# Patient Record
Sex: MALE | Race: WHITE | NOT HISPANIC OR LATINO | Employment: OTHER | URBAN - METROPOLITAN AREA
[De-identification: names, ages, dates, MRNs, and addresses within clinical notes are randomized per-mention and may not be internally consistent; named-entity substitution may affect disease eponyms.]

---

## 2017-01-04 ENCOUNTER — COMMUNICATION - HEALTHEAST (OUTPATIENT)
Dept: FAMILY MEDICINE | Facility: CLINIC | Age: 77
End: 2017-01-04

## 2017-01-04 DIAGNOSIS — K21.9 GERD (GASTROESOPHAGEAL REFLUX DISEASE): ICD-10-CM

## 2017-01-12 ENCOUNTER — COMMUNICATION - HEALTHEAST (OUTPATIENT)
Dept: FAMILY MEDICINE | Facility: CLINIC | Age: 77
End: 2017-01-12

## 2017-01-13 ENCOUNTER — RECORDS - HEALTHEAST (OUTPATIENT)
Dept: GENERAL RADIOLOGY | Facility: CLINIC | Age: 77
End: 2017-01-13

## 2017-01-13 ENCOUNTER — OFFICE VISIT - HEALTHEAST (OUTPATIENT)
Dept: FAMILY MEDICINE | Facility: CLINIC | Age: 77
End: 2017-01-13

## 2017-01-13 DIAGNOSIS — Z00.00 PREVENTATIVE HEALTH CARE: ICD-10-CM

## 2017-01-13 DIAGNOSIS — L30.9 DERMATITIS: ICD-10-CM

## 2017-01-13 DIAGNOSIS — E78.00 HYPERCHOLESTEREMIA: ICD-10-CM

## 2017-01-13 DIAGNOSIS — K21.9 GASTROESOPHAGEAL REFLUX DISEASE WITHOUT ESOPHAGITIS: ICD-10-CM

## 2017-01-13 DIAGNOSIS — K62.89 PROCTITIS: ICD-10-CM

## 2017-01-13 DIAGNOSIS — K21.9 GERD (GASTROESOPHAGEAL REFLUX DISEASE): ICD-10-CM

## 2017-01-13 DIAGNOSIS — R05.9 COUGH: ICD-10-CM

## 2017-01-13 LAB
ATRIAL RATE - MUSE: 53 BPM
DIASTOLIC BLOOD PRESSURE - MUSE: NORMAL MMHG
INTERPRETATION ECG - MUSE: NORMAL
P AXIS - MUSE: 63 DEGREES
PR INTERVAL - MUSE: 158 MS
QRS DURATION - MUSE: 78 MS
QT - MUSE: 434 MS
QTC - MUSE: 407 MS
R AXIS - MUSE: 22 DEGREES
SYSTOLIC BLOOD PRESSURE - MUSE: NORMAL MMHG
T AXIS - MUSE: 45 DEGREES
VENTRICULAR RATE- MUSE: 53 BPM

## 2017-01-13 ASSESSMENT — MIFFLIN-ST. JEOR: SCORE: 1517.62

## 2017-01-17 ENCOUNTER — COMMUNICATION - HEALTHEAST (OUTPATIENT)
Dept: FAMILY MEDICINE | Facility: CLINIC | Age: 77
End: 2017-01-17

## 2017-01-31 ENCOUNTER — COMMUNICATION - HEALTHEAST (OUTPATIENT)
Dept: FAMILY MEDICINE | Facility: CLINIC | Age: 77
End: 2017-01-31

## 2017-03-15 ENCOUNTER — COMMUNICATION - HEALTHEAST (OUTPATIENT)
Dept: FAMILY MEDICINE | Facility: CLINIC | Age: 77
End: 2017-03-15

## 2017-04-01 ENCOUNTER — COMMUNICATION - HEALTHEAST (OUTPATIENT)
Dept: FAMILY MEDICINE | Facility: CLINIC | Age: 77
End: 2017-04-01

## 2017-04-01 DIAGNOSIS — K21.9 GERD (GASTROESOPHAGEAL REFLUX DISEASE): ICD-10-CM

## 2017-04-04 ENCOUNTER — OFFICE VISIT - HEALTHEAST (OUTPATIENT)
Dept: FAMILY MEDICINE | Facility: CLINIC | Age: 77
End: 2017-04-04

## 2017-04-04 DIAGNOSIS — J01.90 ACUTE SINUSITIS: ICD-10-CM

## 2017-04-04 DIAGNOSIS — H69.91 EUSTACHIAN TUBE DYSFUNCTION, RIGHT: ICD-10-CM

## 2017-04-04 DIAGNOSIS — R05.9 COUGH: ICD-10-CM

## 2017-04-05 ENCOUNTER — AMBULATORY - HEALTHEAST (OUTPATIENT)
Dept: FAMILY MEDICINE | Facility: CLINIC | Age: 77
End: 2017-04-05

## 2017-04-05 ENCOUNTER — RECORDS - HEALTHEAST (OUTPATIENT)
Dept: ADMINISTRATIVE | Facility: OTHER | Age: 77
End: 2017-04-05

## 2017-04-05 ENCOUNTER — COMMUNICATION - HEALTHEAST (OUTPATIENT)
Dept: SCHEDULING | Facility: CLINIC | Age: 77
End: 2017-04-05

## 2017-04-05 DIAGNOSIS — R11.0 NAUSEA: ICD-10-CM

## 2017-04-06 ENCOUNTER — COMMUNICATION - HEALTHEAST (OUTPATIENT)
Dept: FAMILY MEDICINE | Facility: CLINIC | Age: 77
End: 2017-04-06

## 2017-09-05 ENCOUNTER — RECORDS - HEALTHEAST (OUTPATIENT)
Dept: ADMINISTRATIVE | Facility: OTHER | Age: 77
End: 2017-09-05

## 2017-10-12 ENCOUNTER — COMMUNICATION - HEALTHEAST (OUTPATIENT)
Dept: FAMILY MEDICINE | Facility: CLINIC | Age: 77
End: 2017-10-12

## 2017-10-12 DIAGNOSIS — K62.89 PROCTITIS: ICD-10-CM

## 2017-10-12 DIAGNOSIS — K21.9 GASTROESOPHAGEAL REFLUX DISEASE WITHOUT ESOPHAGITIS: ICD-10-CM

## 2017-10-13 ENCOUNTER — OFFICE VISIT - HEALTHEAST (OUTPATIENT)
Dept: FAMILY MEDICINE | Facility: CLINIC | Age: 77
End: 2017-10-13

## 2017-10-13 DIAGNOSIS — Z23 NEED FOR INFLUENZA VACCINATION: ICD-10-CM

## 2017-10-13 DIAGNOSIS — H93.8X1 IRRITATION OF EAR, RIGHT: ICD-10-CM

## 2017-10-13 ASSESSMENT — MIFFLIN-ST. JEOR: SCORE: 1588.95

## 2018-04-26 ENCOUNTER — AMBULATORY - HEALTHEAST (OUTPATIENT)
Dept: FAMILY MEDICINE | Facility: CLINIC | Age: 78
End: 2018-04-26

## 2018-04-26 RX ORDER — DOXYCYCLINE 100 MG/1
CAPSULE ORAL
Status: SHIPPED | COMMUNITY
Start: 2017-04-04 | End: 2023-12-05

## 2018-04-26 RX ORDER — ONDANSETRON 4 MG/1
4 TABLET, ORALLY DISINTEGRATING ORAL
Status: SHIPPED | COMMUNITY
Start: 2017-04-05 | End: 2023-07-18

## 2021-05-30 ENCOUNTER — RECORDS - HEALTHEAST (OUTPATIENT)
Dept: ADMINISTRATIVE | Facility: CLINIC | Age: 81
End: 2021-05-30

## 2021-05-30 VITALS — WEIGHT: 182 LBS | BODY MASS INDEX: 25.93 KG/M2

## 2021-05-30 VITALS — HEIGHT: 70 IN | WEIGHT: 178 LBS | BODY MASS INDEX: 25.48 KG/M2

## 2021-05-31 VITALS — WEIGHT: 187.6 LBS | BODY MASS INDEX: 25.41 KG/M2 | HEIGHT: 72 IN

## 2021-06-01 ENCOUNTER — RECORDS - HEALTHEAST (OUTPATIENT)
Dept: ADMINISTRATIVE | Facility: CLINIC | Age: 81
End: 2021-06-01

## 2021-06-08 NOTE — PROGRESS NOTES
"Subjective:    Roscoe Merida is seen today for multiple concerns.  Tickle in throat.  Causes cough occasionally.  Does have history reflux and dyspepsia.  Uses omeprazole.  When he stops this is symptoms worsen.  Did quit smoking 40 years ago after 25 pack year history without chronic concerns for breathing issues etc.  No chest pain.  Perhaps occasional palpitation noted.  Has had mild cholesterol elevation however ratio less than 4.  Past medical social and family history reviewed and updated as noted below.  Skin irritation behind right ear.  He is gassy with flatus described.  No bright red blood per rectum currently.  No diarrhea or constipation concerns.    Remarried - Betsey x 2016   x  (prior  \"Connie\" x 73 - benign brain tumor, then PE noted following surgery, then seizure occurred...)  2 sons - Neftali and Justin   1 step-son - David, 1 step-daughter - Gladys   No smoke - quit ~ 39 y.o. after ~ 25 pack-year   EtOH occ beer   Surgeries: T & A as child   Hospitalizations: none   Mom -  (phlebitis)   Dad -  (minor heart attacks)   2 sis - arthritis   1 bro - Parkinson's   Work: unemployeed (can't find a job due to age) - prior \"runner\" with [employer] [id number]   Genaro Benavides (takes part in [employer])   Colonoscopy 11 with \"advance adenoma\" (tubulovillous) with recommendation for repeat in 6 months however patient cannot afford (insurance will only cover q 2 years) - subsequent colonoscopy with tubular adenoma 2014 and told to repeat 5 years.    Past Surgical History   Procedure Laterality Date     Tonsillectomy and adenoidectomy          Family History   Problem Relation Age of Onset     Heart disease Father      Parkinsonism Brother         Past Medical History   Diagnosis Date     BPH (benign prostatic hyperplasia)         Social History   Substance Use Topics     Smoking status: Former Smoker     Smokeless tobacco: None     " "Alcohol use No        Current Outpatient Prescriptions   Medication Sig Dispense Refill     ascorbic Acid (VITAMIN C) 500 mg CpER Take 1,000 mg by mouth daily.       aspirin 81 MG EC tablet Take 81 mg by mouth daily.       diphenhydrAMINE (BENADRYL) 25 mg tablet Take 25 mg by mouth.       hydrocortisone (PROCTOSOL HC) 2.5 % rectal cream APPLY TO ANAL AREA TWO TIMES A DAY AS NEEDED FOR IRRITATION 85.05 g 3     MULTIVITAMIN ORAL Take 1 tablet by mouth daily.       omeprazole (PRILOSEC) 20 MG capsule TAKE ONE CAPSULE BY MOUTH EVERY DAY 90 capsule 3     sildenafil (VIAGRA) 100 MG tablet Take 1 tablet (100 mg total) by mouth as needed for erectile dysfunction. 10 tablet 1     triamcinolone (KENALOG) 0.1 % ointment Apply topically 2 (two) times a day. 30 g 0     No current facility-administered medications for this visit.           Objective:    Vitals:    01/13/17 0758   BP: 120/60   Pulse: 60   Weight: 178 lb (80.7 kg)   Height: 5' 10.25\" (1.784 m)      Body mass index is 25.36 kg/(m^2).    Alert.  No apparent distress.  Chest clear.  Cardiac exam regular.  HEENT exam with some dermatitis postauricular location on right side.  TMs otherwise normal with nasopharyngeal oropharynx unremarkable.  Status post tonsillectomy noted.  Abdomen benign without mass.  Extremities warm and dry.     EKG:  EKG today with PAC with ventricular rate 53 otherwise normal.      XR CHEST PA AND LATERAL1/13/2017 8:38 AMINDICATION: CoughCOMPARISON: None.FINDINGS: Negative chest.This report was electronically interpreted by: Dr. Jay Schofield MD ON 01/13/2017 at 09:00    Assessment:    1. Hypercholesteremia  Electrocardiogram Perform and Read   2. GERD (gastroesophageal reflux disease)     3. Cough  XR Chest PA and Lateral   4. Dermatitis     5. Preventative health care  Tdap vaccine greater than or equal to 6yo IM   6. Gastroesophageal reflux disease without esophagitis  omeprazole (PRILOSEC) 20 MG capsule   7. Proctitis  hydrocortisone " (PROCTOSOL HC) 2.5 % rectal cream         Plan:    40 minutes total time with patient, > 50% with counseling and coordination of cares.  EKG today with PAC with ventricular rate 53 otherwise normal.  Chest x-ray was obtained due to intermittent cough likely reflux etiology versus mild recent URI.  Will notify with results of chest x-ray for abnormal.  Hydrocortisone cream 1% twice daily as needed to areas of dermatitis right post auricular location.  Adacel booster given.  Did instruct patient use omeprazole and consistent basis for reflux management as well as likely improvement in intermittent cough.  Refill provided on hydrocortisone rectal cream for proctitis management.  Anticipate physical exam August 2017.

## 2021-06-09 NOTE — PROGRESS NOTES
"Subjective:    Roscoe Merida is seen today for ongoing illness.  Described concerns with postnasal drainage.  Worsening now over past week.  Initially thought perhaps allergies.  Headache more frontal headache.  Cough with tickle.  Postnasal drainage.  Right ear feels plugged.  Cough that is quite disruptive.  Laryngitis in the last day.  Denies shortness of breath.  No chest pain.  Immunizations reviewed and up-to-date including Pneumovax and Prevnar.  No nausea vomiting.  No diarrhea.  Wife currently well.  Flu shot received 2016.  Denies recent travel.    Remarried - Betsey x 2016   x  (prior  \"Connie\" x 73 - benign brain tumor, then PE noted following surgery, then seizure occurred...)  2 sons - Neftali and Justin   1 step-son - David, 1 step-daughter - Gladys   No smoke - quit ~ 39 y.o. after ~ 25 pack-year   EtOH occ beer   Surgeries: T & A as child   Hospitalizations: none   Mom -  (phlebitis)   Dad -  (minor heart attacks)   2 sis - arthritis   1 bro - Parkinson's   Work: unemployeed (can't find a job due to age) - prior \"runner\" with [employer]   [employer] [id number]   Genaro Benavides (takes part in [employer])   Colonoscopy 11 with \"advance adenoma\" (tubulovillous) with recommendation for repeat in 6 months however patient cannot afford (insurance will only cover q 2 years) - subsequent colonoscopy with tubular adenoma 2014 and told to repeat 5 years.    Past Surgical History:   Procedure Laterality Date     TONSILLECTOMY AND ADENOIDECTOMY          Family History   Problem Relation Age of Onset     Heart disease Father      Parkinsonism Brother         Past Medical History:   Diagnosis Date     BPH (benign prostatic hyperplasia)         Social History   Substance Use Topics     Smoking status: Former Smoker     Smokeless tobacco: None     Alcohol use No        Current Outpatient Prescriptions   Medication Sig Dispense Refill     " ascorbic Acid (VITAMIN C) 500 mg CpER Take 1,000 mg by mouth daily.       aspirin 81 MG EC tablet Take 81 mg by mouth daily.       diphenhydrAMINE (BENADRYL) 25 mg tablet Take 25 mg by mouth.       hydrocortisone (PROCTOSOL HC) 2.5 % rectal cream APPLY TO ANAL AREA TWO TIMES A DAY AS NEEDED FOR IRRITATION 85.05 g 3     MULTIVITAMIN ORAL Take 1 tablet by mouth daily.       omeprazole (PRILOSEC) 20 MG capsule TAKE ONE CAPSULE BY MOUTH EVERY DAY 90 capsule 3     triamcinolone (KENALOG) 0.1 % ointment Apply topically 2 (two) times a day. 30 g 0     codeine-guaiFENesin (GUAIFENESIN AC)  mg/5 mL liquid Take 5-10 mL by mouth 3 (three) times a day as needed for cough. 240 mL 0     doxycycline (VIBRAMYCIN) 100 MG capsule Take 1 capsule (100 mg total) by mouth 2 (two) times a day for 10 days. 20 capsule 0     sildenafil (VIAGRA) 100 MG tablet Take 1 tablet (100 mg total) by mouth as needed for erectile dysfunction. 10 tablet 1     No current facility-administered medications for this visit.           Objective:    Vitals:    04/04/17 1514   BP: 130/60   Pulse: 72   Temp: 97.9  F (36.6  C)   Weight: 182 lb (82.6 kg)      Body mass index is 25.93 kg/(m^2).    Alert.  Laryngitis noted.  Quiet voice.  Frequent cough noted.  HEENT exam with conjunctiva clear.  Right TM with chronic tympanosclerosis and some white retracted appearance otherwise external auditory canals normal and the left TM normal.  Nasopharynx with increased congestion and scant postnasal drainage and oral pharyngeal exam.  Moist mucous membranes.  Neck supple.  Chest relatively clear without inspiratory crackles or expiratory wheeze.  Cardiac exam appears regular.  Extremities warm and dry.      Assessment:    1. Acute sinusitis  doxycycline (VIBRAMYCIN) 100 MG capsule   2. Eustachian tube dysfunction, right     3. Cough  codeine-guaiFENesin (GUAIFENESIN AC)  mg/5 mL liquid         Plan:    Discussed sinusitis management.  Elects doxycycline 100 mg  twice daily ×10 days.  Guaifenesin with codeine for cough suppression as directed.  Mucinex OTC may be utilized for eustachian tube dysfunction notify with worsening or nonimprovement.

## 2021-06-13 NOTE — PROGRESS NOTES
Assessment/Plan:     1. Irritation of ear, right  Area of dried cerumen was flushed with lavage and finished with curette.  Did report resolution of symptoms.  We will plan to continue to monitor.  Call or return to care if symptoms worsen or do not improve.  No signs of infection.    2. Need for influenza vaccination  Flu vaccine given.        Subjective:      Roscoe Merida is a 77 y.o. male comes in today for follow-up on irritation on the right ear.  He states that this has been going on intermittently he noticed it first about a month ago.  He was seen at the emergency room I was able to review the visit notes via care everywhere.  At that time they told him that some hairs were irritating his eardrum.  States that those were flushed out and he was given a prescription of ofloxacin drops.  Interestingly enough they actually gave him ophthalmic drops but told him it was okay to use in the ER.  He states that he felt better after he was seen there.  Was told to only use the drops if he needed it.  He states that he has had an irritation a total of 3 times.  He states that he put a few of the drops in and it seems to go away.  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  He states he feels well overall he does not have any sinus pain or pressure runny nose or symptoms of illnesses.  He states it is more of an irritation rather than a pain he has had no change in his hearing.  He does not have any concerns for his left ear.  He would like to get the flu shot today.    Current Outpatient Prescriptions   Medication Sig Dispense Refill     ascorbic Acid (VITAMIN C) 500 mg CpER Take 1,000 mg by mouth daily.       aspirin 81 MG EC tablet Take 81 mg by mouth daily.       hydrocortisone (PROCTOSOL HC) 2.5 % rectal cream APPLY TO ANAL AREA TWO TIMES A DAY AS NEEDED FOR IRRITATION 85.05 g 3     MULTIVITAMIN ORAL Take 1 tablet by mouth daily.       omeprazole (PRILOSEC) 20  MG capsule TAKE ONE CAPSULE BY MOUTH EVERY DAY 90 capsule 3     ondansetron (ZOFRAN, AS HYDROCHLORIDE,) 4 MG tablet Take 1 tablet (4 mg total) by mouth every 8 (eight) hours as needed for nausea. 20 tablet 1     triamcinolone (KENALOG) 0.1 % ointment Apply topically 2 (two) times a day. 30 g 0     diphenhydrAMINE (BENADRYL) 25 mg tablet Take 25 mg by mouth.       HYDROcodone-acetaminophen 5-325 mg per tablet 1-2 tabs po q4-6 hours as needed for pain 15 tablet 0     No current facility-administered medications for this visit.      Allergies   Allergen Reactions     Penicillins      Past Medical History, Family History, and Social History reviewed.  Past Medical History:   Diagnosis Date     BPH (benign prostatic hyperplasia)      Hyperlipidemia      Migraine      Past Surgical History:   Procedure Laterality Date     TONSILLECTOMY AND ADENOIDECTOMY       Penicillins  Family History   Problem Relation Age of Onset     Heart disease Father      Parkinsonism Brother      Social History     Social History     Marital status:      Spouse name: N/A     Number of children: N/A     Years of education: N/A     Occupational History     Not on file.     Social History Main Topics     Smoking status: Former Smoker     Smokeless tobacco: Not on file     Alcohol use No     Drug use: No     Sexual activity: Not on file     Other Topics Concern     Not on file     Social History Narrative         Review of systems is as stated in HPI, and the remainder of the 10 system review is otherwise negative.    Objective:     Vitals:    10/13/17 0719   BP: 122/60   Patient Site: Left Arm   Patient Position: Sitting   Cuff Size: Adult Regular   Pulse: 64   Weight: 187 lb 9.6 oz (85.1 kg)   Height: 6' (1.829 m)    Body mass index is 25.44 kg/(m^2).  Wt Readings from Last 3 Encounters:   10/13/17 187 lb 9.6 oz (85.1 kg)   04/06/17 174 lb (78.9 kg)   04/04/17 182 lb (82.6 kg)       General Appearance:    Alert, cooperative, no distress,  appears stated age    Head:    Normocephalic, without obvious abnormality, atraumatic   Eyes:    PERRL, EOM's intact, no conjunctivitis    Ears:   Bilateral tympanic membranes are normal.  There is initially a area of dried cerumen overlying part of the tympanic membrane in the right this is removed revealing normal tympanic membranes otherwise normal external ear canals   Nose:   Mucosa normal, no drainage     or sinus tenderness   Throat:   Oropharynx is clear   Neck:   Supple, symmetrical, no adenopathy, no thyromegally       Lungs:     Clear to auscultation bilaterally, respirations unlabored   Chest Wall:    No tenderness or deformity    Heart:    Regular rate and rhythm, S1 and S2 normal, no murmur, rub    or gallop                           Neuro:   cranial nerves grossly intact   Psych:   grossly normal mood and affect without acute anxiety or psychosis    Skin:   No rashes or lesions   :          This note has been dictated using voice recognition software. Any grammatical or context distortions are unintentional and inherent to the software.

## 2021-06-26 ENCOUNTER — HEALTH MAINTENANCE LETTER (OUTPATIENT)
Age: 81
End: 2021-06-26

## 2021-10-16 ENCOUNTER — HEALTH MAINTENANCE LETTER (OUTPATIENT)
Age: 81
End: 2021-10-16

## 2022-07-23 ENCOUNTER — HEALTH MAINTENANCE LETTER (OUTPATIENT)
Age: 82
End: 2022-07-23

## 2022-10-01 ENCOUNTER — HEALTH MAINTENANCE LETTER (OUTPATIENT)
Age: 82
End: 2022-10-01

## 2023-07-18 ENCOUNTER — HOSPITAL ENCOUNTER (EMERGENCY)
Facility: HOSPITAL | Age: 83
Discharge: HOME | End: 2023-07-18
Attending: EMERGENCY MEDICINE | Admitting: EMERGENCY MEDICINE
Payer: COMMERCIAL

## 2023-07-18 ENCOUNTER — APPOINTMENT (OUTPATIENT)
Dept: CT IMAGING | Facility: HOSPITAL | Age: 83
End: 2023-07-18
Attending: EMERGENCY MEDICINE
Payer: COMMERCIAL

## 2023-07-18 VITALS
TEMPERATURE: 97.5 F | HEART RATE: 63 BPM | DIASTOLIC BLOOD PRESSURE: 78 MMHG | BODY MASS INDEX: 23.91 KG/M2 | RESPIRATION RATE: 16 BRPM | SYSTOLIC BLOOD PRESSURE: 167 MMHG | HEIGHT: 70 IN | OXYGEN SATURATION: 98 % | WEIGHT: 167 LBS

## 2023-07-18 DIAGNOSIS — J32.9 SINUSITIS, UNSPECIFIED CHRONICITY, UNSPECIFIED LOCATION: ICD-10-CM

## 2023-07-18 DIAGNOSIS — R51.9 HEADACHE, UNSPECIFIED HEADACHE TYPE: ICD-10-CM

## 2023-07-18 DIAGNOSIS — R11.2 NAUSEA AND VOMITING, UNSPECIFIED VOMITING TYPE: ICD-10-CM

## 2023-07-18 DIAGNOSIS — E87.5 HYPERKALEMIA: ICD-10-CM

## 2023-07-18 PROBLEM — R97.20 ELEVATED PSA: Status: ACTIVE | Noted: 2023-07-18

## 2023-07-18 PROBLEM — G31.84 MINIMAL COGNITIVE IMPAIRMENT: Status: ACTIVE | Noted: 2023-07-18

## 2023-07-18 PROBLEM — N40.1 BENIGN PROSTATIC HYPERPLASIA WITH URINARY OBSTRUCTION: Status: ACTIVE | Noted: 2018-02-08

## 2023-07-18 PROBLEM — N13.8 BENIGN PROSTATIC HYPERPLASIA WITH URINARY OBSTRUCTION: Status: ACTIVE | Noted: 2018-02-08

## 2023-07-18 PROBLEM — C44.90 MALIGNANT NEOPLASM OF SKIN: Status: ACTIVE | Noted: 2019-06-20

## 2023-07-18 LAB
ALBUMIN SERPL BCG-MCNC: 4.6 G/DL (ref 3.5–5.2)
ALP SERPL-CCNC: 88 U/L (ref 40–129)
ALT SERPL W P-5'-P-CCNC: ABNORMAL U/L
ANION GAP SERPL CALCULATED.3IONS-SCNC: 11 MMOL/L (ref 7–15)
ANION GAP SERPL CALCULATED.3IONS-SCNC: 12 MMOL/L (ref 7–15)
AST SERPL W P-5'-P-CCNC: ABNORMAL U/L
BASOPHILS # BLD AUTO: 0 10E3/UL (ref 0–0.2)
BASOPHILS NFR BLD AUTO: 0 %
BILIRUB SERPL-MCNC: 1 MG/DL
BUN SERPL-MCNC: 15.3 MG/DL (ref 8–23)
BUN SERPL-MCNC: 15.7 MG/DL (ref 8–23)
CALCIUM SERPL-MCNC: 9 MG/DL (ref 8.8–10.2)
CALCIUM SERPL-MCNC: 9.4 MG/DL (ref 8.8–10.2)
CHLORIDE SERPL-SCNC: 98 MMOL/L (ref 98–107)
CHLORIDE SERPL-SCNC: 99 MMOL/L (ref 98–107)
CREAT SERPL-MCNC: 0.73 MG/DL (ref 0.67–1.17)
CREAT SERPL-MCNC: 0.77 MG/DL (ref 0.67–1.17)
CRP SERPL-MCNC: <3 MG/L
DEPRECATED HCO3 PLAS-SCNC: 23 MMOL/L (ref 22–29)
DEPRECATED HCO3 PLAS-SCNC: 24 MMOL/L (ref 22–29)
EOSINOPHIL # BLD AUTO: 0 10E3/UL (ref 0–0.7)
EOSINOPHIL NFR BLD AUTO: 0 %
ERYTHROCYTE [DISTWIDTH] IN BLOOD BY AUTOMATED COUNT: 12.3 % (ref 10–15)
GFR SERPL CREATININE-BSD FRML MDRD: 88 ML/MIN/1.73M2
GFR SERPL CREATININE-BSD FRML MDRD: 90 ML/MIN/1.73M2
GLUCOSE SERPL-MCNC: 112 MG/DL (ref 70–99)
GLUCOSE SERPL-MCNC: 112 MG/DL (ref 70–99)
HCT VFR BLD AUTO: 44.1 % (ref 40–53)
HGB BLD-MCNC: 14.7 G/DL (ref 13.3–17.7)
IMM GRANULOCYTES # BLD: 0 10E3/UL
IMM GRANULOCYTES NFR BLD: 0 %
LIPASE SERPL-CCNC: 22 U/L (ref 13–60)
LYMPHOCYTES # BLD AUTO: 0.6 10E3/UL (ref 0.8–5.3)
LYMPHOCYTES NFR BLD AUTO: 10 %
MCH RBC QN AUTO: 31.3 PG (ref 26.5–33)
MCHC RBC AUTO-ENTMCNC: 33.3 G/DL (ref 31.5–36.5)
MCV RBC AUTO: 94 FL (ref 78–100)
MONOCYTES # BLD AUTO: 0.2 10E3/UL (ref 0–1.3)
MONOCYTES NFR BLD AUTO: 4 %
NEUTROPHILS # BLD AUTO: 5.6 10E3/UL (ref 1.6–8.3)
NEUTROPHILS NFR BLD AUTO: 86 %
NRBC # BLD AUTO: 0 10E3/UL
NRBC BLD AUTO-RTO: 0 /100
PLATELET # BLD AUTO: 261 10E3/UL (ref 150–450)
POTASSIUM SERPL-SCNC: 5.6 MMOL/L (ref 3.4–5.3)
POTASSIUM SERPL-SCNC: 5.6 MMOL/L (ref 3.4–5.3)
PROT SERPL-MCNC: 7.4 G/DL (ref 6.4–8.3)
RBC # BLD AUTO: 4.69 10E6/UL (ref 4.4–5.9)
SODIUM SERPL-SCNC: 133 MMOL/L (ref 136–145)
SODIUM SERPL-SCNC: 134 MMOL/L (ref 136–145)
WBC # BLD AUTO: 6.5 10E3/UL (ref 4–11)

## 2023-07-18 PROCEDURE — 250N000011 HC RX IP 250 OP 636: Performed by: EMERGENCY MEDICINE

## 2023-07-18 PROCEDURE — 86140 C-REACTIVE PROTEIN: CPT | Performed by: EMERGENCY MEDICINE

## 2023-07-18 PROCEDURE — 99285 EMERGENCY DEPT VISIT HI MDM: CPT | Mod: 25

## 2023-07-18 PROCEDURE — 70450 CT HEAD/BRAIN W/O DYE: CPT

## 2023-07-18 PROCEDURE — 250N000013 HC RX MED GY IP 250 OP 250 PS 637: Performed by: EMERGENCY MEDICINE

## 2023-07-18 PROCEDURE — 84155 ASSAY OF PROTEIN SERUM: CPT | Performed by: EMERGENCY MEDICINE

## 2023-07-18 PROCEDURE — 85004 AUTOMATED DIFF WBC COUNT: CPT | Performed by: EMERGENCY MEDICINE

## 2023-07-18 PROCEDURE — 96374 THER/PROPH/DIAG INJ IV PUSH: CPT

## 2023-07-18 PROCEDURE — 83690 ASSAY OF LIPASE: CPT | Performed by: EMERGENCY MEDICINE

## 2023-07-18 PROCEDURE — 96361 HYDRATE IV INFUSION ADD-ON: CPT

## 2023-07-18 PROCEDURE — 96375 TX/PRO/DX INJ NEW DRUG ADDON: CPT

## 2023-07-18 PROCEDURE — 80048 BASIC METABOLIC PNL TOTAL CA: CPT | Performed by: EMERGENCY MEDICINE

## 2023-07-18 PROCEDURE — 258N000003 HC RX IP 258 OP 636: Performed by: EMERGENCY MEDICINE

## 2023-07-18 PROCEDURE — 36415 COLL VENOUS BLD VENIPUNCTURE: CPT | Performed by: EMERGENCY MEDICINE

## 2023-07-18 RX ORDER — ACETAMINOPHEN 325 MG/1
975 TABLET ORAL ONCE
Status: COMPLETED | OUTPATIENT
Start: 2023-07-18 | End: 2023-07-18

## 2023-07-18 RX ORDER — AZITHROMYCIN 250 MG/1
TABLET, FILM COATED ORAL
Qty: 6 TABLET | Refills: 0 | Status: SHIPPED | OUTPATIENT
Start: 2023-07-18 | End: 2023-07-23

## 2023-07-18 RX ORDER — ONDANSETRON 2 MG/ML
4 INJECTION INTRAMUSCULAR; INTRAVENOUS ONCE
Status: COMPLETED | OUTPATIENT
Start: 2023-07-18 | End: 2023-07-18

## 2023-07-18 RX ORDER — KETOROLAC TROMETHAMINE 15 MG/ML
15 INJECTION, SOLUTION INTRAMUSCULAR; INTRAVENOUS ONCE
Status: COMPLETED | OUTPATIENT
Start: 2023-07-18 | End: 2023-07-18

## 2023-07-18 RX ORDER — ONDANSETRON 4 MG/1
4 TABLET, ORALLY DISINTEGRATING ORAL ONCE
Status: COMPLETED | OUTPATIENT
Start: 2023-07-18 | End: 2023-07-18

## 2023-07-18 RX ORDER — ONDANSETRON 4 MG/1
4 TABLET, ORALLY DISINTEGRATING ORAL EVERY 6 HOURS PRN
Qty: 10 TABLET | Refills: 0 | Status: SHIPPED | OUTPATIENT
Start: 2023-07-18 | End: 2023-07-21

## 2023-07-18 RX ADMIN — ONDANSETRON 4 MG: 4 TABLET, ORALLY DISINTEGRATING ORAL at 11:21

## 2023-07-18 RX ADMIN — ACETAMINOPHEN 975 MG: 325 TABLET ORAL at 13:15

## 2023-07-18 RX ADMIN — KETOROLAC TROMETHAMINE 15 MG: 15 INJECTION INTRAMUSCULAR; INTRAVENOUS at 12:55

## 2023-07-18 RX ADMIN — ONDANSETRON 4 MG: 2 INJECTION INTRAMUSCULAR; INTRAVENOUS at 12:54

## 2023-07-18 RX ADMIN — SODIUM CHLORIDE 500 ML: 9 INJECTION, SOLUTION INTRAVENOUS at 11:43

## 2023-07-18 ASSESSMENT — ENCOUNTER SYMPTOMS
NUMBNESS: 0
WEAKNESS: 0
VOMITING: 1
NAUSEA: 1
FEVER: 0
HEADACHES: 1
ABDOMINAL PAIN: 0
CHILLS: 0

## 2023-07-18 NOTE — ED TRIAGE NOTES
"Patient states he ate at [employer] yesterday and the sauce they use for shrimp tasted \"off\" awoke at 0530 with nausea and vomiting. Also complaining of headache. Did have a normal stool this am before vomiting started      "

## 2023-07-18 NOTE — DISCHARGE INSTRUCTIONS
Your potassium level today was slightly elevated but both lab specimens were hemolyzed which artificially increase the potassium level.  Make sure you stay well-hydrated.  Have your doctor recheck the potassium level in the next few days.

## 2023-07-18 NOTE — ED PROVIDER NOTES
Emergency Department Encounter      NAME: Roscoe Merida  AGE: 83 year old male  YOB: 1940  MRN: [id number]  EVALUATION DATE & TIME: No admission date for patient encounter.    PCP: [facility]    ED PROVIDER: Misbah Millan M.D.      Chief Complaint   Patient presents with     Vomiting         FINAL IMPRESSION:  1. Sinusitis, unspecified chronicity, unspecified location    2. Headache, unspecified headache type    3. Nausea and vomiting, unspecified vomiting type    4. Hyperkalemia        MEDICAL DECISION MAKIN:12 PM I met with the patient, obtained history, performed an initial exam, and discussed options and plan for diagnostics and treatment here in the ED.     This patient is an 83-year-old male who presents with vomiting.  He said that he ate at [email or web address] yesterday and then early this morning around 4 AM he began to vomit.  He says that he has had numerous vomiting episodes.  He complains of new headache in his forehead.  His neurologic exam is unremarkable.  He had not had any fevers or chills.  No abdominal pain.  No numbness weakness or incoordination of the arms or legs.  He had a benign abdominal exam.  I was concerned about the possibility of a intracranial process causing the headache and the vomiting.  The differential of this would include subarachnoid hemorrhage, intracranial neoplasm, sinusitis, subdural hematoma or epidural hematoma.  Head CT was done which did not show any acute intracranial findings but did show signs of sinusitis.  I independently reviewed and interpreted the head CT.  The patient's nausea is better with Zofran and he was given a bolus of IV normal saline while in the ER.  I discussed with him the results of the CT scan and include a prescription for azithromycin and Zofran in his discharge paperwork.  His initial lab work showed a slightly elevated potassium but the blood was hemolyzed so another specimen was sent.  Unfortunately this blood  was also hemolyzed and gave almost the identical result.  His renal function was normal though.  I recommended that the patient get a outpatient potassium level rechecked in the next couple days to ensure that it continues to be normal.    Pertinent Labs & Imaging studies reviewed. (See chart for details)    The importance of close follow up was discussed. We reviewed warning signs and symptoms, and I instructed Mr. Merida to return to the emergency department immediately if he develops any new or worsening symptoms. I provided additional verbal discharge instructions. Mr. Merida expressed understanding and agreement with this plan of care, his questions were answered, and he was discharged in stable condition.     Medical Decision Making     History:    Supplemental history from: Documented in chart, if applicable    External Record(s) reviewed: Documented in chart, if applicable.     Work Up:    Chart documentation includes differential considered and any EKGs or imaging independently interpreted by provider, where specified.    In additional to work up documented, I considered the following work up: Documented in chart, if applicable.     External consultation:    Discussion of management with another provider: Documented in chart, if applicable     Complicating factors:    Care impacted by chronic illness: migraines, benign tubular adenoma of large intestine, hypercholesterolemia, skin cancer     Care affected by social determinants of health: Access to Medical Care     Disposition considerations:  Discharge patient with prescription for azithromycin and Zofran      MEDICATIONS GIVEN IN THE EMERGENCY:  Medications   ondansetron (ZOFRAN ODT) ODT tab 4 mg (4 mg Oral $Given 7/18/23 1121)   0.9% sodium chloride BOLUS (0 mLs Intravenous Stopped 7/18/23 1254)   ketorolac (TORADOL) injection 15 mg (15 mg Intravenous $Given 7/18/23 1255)   acetaminophen (TYLENOL) tablet 975 mg (975 mg Oral $Given 7/18/23 1315)    ondansetron (ZOFRAN) injection 4 mg (4 mg Intravenous $Given 7/18/23 2324)       NEW PRESCRIPTIONS STARTED AT TODAY'S ER VISIT:  New Prescriptions    AZITHROMYCIN (ZITHROMAX Z-FANNY) 250 MG TABLET    Two tablets on the first day, then one tablet daily for the next 4 days    ONDANSETRON (ZOFRAN ODT) 4 MG ODT TAB    Take 1 tablet (4 mg) by mouth every 6 hours as needed for nausea          =================================================================    HPI    Patient information was obtained from: Patient    Use of : N/A        Roscoe Merida is a 83 year old male with a past medical history of migraines, benign tubular adenoma of large intestine, hypercholesterolemia, skin cancer , who presents with vomiting.    Patient states he ate at [employer] yesterday, came home and went to bed feeling fine. Around 4:00 AM (~8 hours ago) the patient woke up with nausea and has been having repeated emesis since. He additionally endorses a 5/10 in severity headache localized to his forehead, states it is similar to the headaches he occasionally gets. He denies any body aches, fevers, abdominal pain, numbness, weakness, or changes in coordination.       REVIEW OF SYSTEMS   Review of Systems   Constitutional: Negative for chills and fever.   Gastrointestinal: Positive for nausea and vomiting. Negative for abdominal pain.   Neurological: Positive for headaches (05/10 in severity). Negative for weakness and numbness.   All other systems reviewed and are negative.       PAST MEDICAL HISTORY:  No past medical history on file.    PAST SURGICAL HISTORY:  Past Surgical History:   Procedure Laterality Date     TONSILLECTOMY & ADENOIDECTOMY         CURRENT MEDICATIONS:    No current facility-administered medications for this encounter.    Current Outpatient Medications:      azithromycin (ZITHROMAX Z-FANNY) 250 MG tablet, Two tablets on the first day, then one tablet daily for the next 4 days, Disp: 6 tablet, Rfl: 0      ondansetron (ZOFRAN ODT) 4 MG ODT tab, Take 1 tablet (4 mg) by mouth every 6 hours as needed for nausea, Disp: 10 tablet, Rfl: 0     ascorbic Acid (VITAMIN C) 500 mg CpER, [ASCORBIC ACID (VITAMIN C) 500 MG CPER] Take 1,000 mg by mouth daily., Disp: , Rfl:      aspirin 81 MG EC tablet, [ASPIRIN 81 MG EC TABLET] Take 81 mg by mouth daily., Disp: , Rfl:      diphenhydrAMINE (BENADRYL) 25 mg tablet, [DIPHENHYDRAMINE (BENADRYL) 25 MG TABLET] Take 25 mg by mouth., Disp: , Rfl:      doxycycline (VIBRAMYCIN) 100 MG capsule, [DOXYCYCLINE (VIBRAMYCIN) 100 MG CAPSULE] , Disp: , Rfl:      HYDROcodone-acetaminophen 5-325 mg per tablet, [HYDROCODONE-ACETAMINOPHEN 5-325 MG PER TABLET] 1-2 tabs po q4-6 hours as needed for pain, Disp: 15 tablet, Rfl: 0     hydrocortisone (PROCTOSOL HC) 2.5 % rectal cream, [HYDROCORTISONE (PROCTOSOL HC) 2.5 % RECTAL CREAM] APPLY TO ANAL AREA TWO TIMES A DAY AS NEEDED FOR IRRITATION, Disp: 85.05 g, Rfl: 3     MULTIVITAMIN ORAL, [MULTIVITAMIN ORAL] Take 1 tablet by mouth daily., Disp: , Rfl:      omeprazole (PRILOSEC) 20 MG capsule, [OMEPRAZOLE (PRILOSEC) 20 MG CAPSULE] TAKE ONE CAPSULE BY MOUTH EVERY DAY, Disp: 90 capsule, Rfl: 3     promethazine (PHENERGAN) 25 MG tablet, [PROMETHAZINE (PHENERGAN) 25 MG TABLET] Take 1 tablet (25 mg total) by mouth every 6 (six) hours as needed for nausea., Disp: 10 tablet, Rfl: 0     triamcinolone (KENALOG) 0.1 % ointment, [TRIAMCINOLONE (KENALOG) 0.1 % OINTMENT] Apply topically 2 (two) times a day., Disp: 30 g, Rfl: 0    ALLERGIES:  Allergies   Allergen Reactions     Penicillins Unknown       FAMILY HISTORY:  Family History   Problem Relation Age of Onset     Heart Disease Father      Parkinsonism Brother        SOCIAL HISTORY:   Social History     Socioeconomic History     Marital status: Patient Declined   Tobacco Use     Smoking status: Former   Substance and Sexual Activity     Alcohol use: No     Drug use: No       PHYSICAL EXAM:    Vitals: BP (!) 167/78    "Pulse 63   Temp 97.5  F (36.4  C) (Oral)   Resp 16   Ht 1.778 m (5' 10\")   Wt 75.8 kg (167 lb)   SpO2 98%   BMI 23.96 kg/m     Constitutional: Well developed, well nourished. Comfortable appearing. Flat affect   HEAD:Normocephalic, atraumatic,   Eyes: PERRLA, EOM intact, conjunctiva clear, no discharge  ENT: mucous membranes moist, nose normal. Tacky mucous membranes  Neck- Supple, gross ROM intact.  No JVD.  No palpable nodes.  Pulmonary: Clear to auscultation bilaterally, no respiratory distress, no wheezing, speaks full sentences easily.  Chest: No chest wall tenderness  Cardiovascular: Normal heart rate, regular rhythm, no murmurs. No lower extremity edema, 2+ DP pulses.   GI: Soft, no tenderness to deep palpation in all quadrants, not distended, no masses.  No hepatosplenomegaly.  Musculoskeletal: Moving all 4 extremities intentionally and without pain. No obvious deformity.  Back: No CVA tenderness  Skin: Warm, dry, no rash.  Neurologic: Alert & oriented x 3, speech clear, moving all extremities spontaneously   Psychiatric: Affect normal, cooperative.     LAB:  All pertinent labs reviewed and interpreted.  Labs Ordered and Resulted from Time of ED Arrival to Time of ED Departure   COMPREHENSIVE METABOLIC PANEL - Abnormal       Result Value    Sodium 133 (*)     Potassium 5.6 (*)     Chloride 98      Carbon Dioxide (CO2) 24      Anion Gap 11      Urea Nitrogen 15.7      Creatinine 0.77      Calcium 9.4      Glucose 112 (*)     Alkaline Phosphatase 88      AST        ALT        Protein Total 7.4      Albumin 4.6      Bilirubin Total 1.0      GFR Estimate 88     CBC WITH PLATELETS AND DIFFERENTIAL - Abnormal    WBC Count 6.5      RBC Count 4.69      Hemoglobin 14.7      Hematocrit 44.1      MCV 94      MCH 31.3      MCHC 33.3      RDW 12.3      Platelet Count 261      % Neutrophils 86      % Lymphocytes 10      % Monocytes 4      % Eosinophils 0      % Basophils 0      % Immature Granulocytes 0      NRBCs " per 100 WBC 0      Absolute Neutrophils 5.6      Absolute Lymphocytes 0.6 (*)     Absolute Monocytes 0.2      Absolute Eosinophils 0.0      Absolute Basophils 0.0      Absolute Immature Granulocytes 0.0      Absolute NRBCs 0.0     BASIC METABOLIC PANEL - Abnormal    Sodium 134 (*)     Potassium 5.6 (*)     Chloride 99      Carbon Dioxide (CO2) 23      Anion Gap 12      Urea Nitrogen 15.3      Creatinine 0.73      Calcium 9.0      Glucose 112 (*)     GFR Estimate 90     LIPASE - Normal    Lipase 22     CRP INFLAMMATION - Normal    CRP Inflammation <3.00         RADIOLOGY:  CT Head w/o Contrast   Final Result   IMPRESSION:   1.  No CT evidence for acute intracranial process.      2.  Brain atrophy and presumed chronic microvascular ischemic changes as above.      3.  Stable intracranial appearance from 04/06/2017.      4.  Partial opacification right sphenoid sinus and anterior left ethmoid air cells unchanged. Diminished membrane thickening left maxillary sinus. No other differences.                                   I, Torey Gallegos, am serving as a scribe to document services personally performed by Dr. Misbah Millan based on my observation and the provider's statements to me. I, Misbah Millan M.D. attest that Torey Gallegos is acting in a scribe capacity, has observed my performance of the services and has documented them in accordance with my direction.      Misbah Millan M.D.  Emergency Medicine  [facility] EMERGENCY DEPARTMENT  [facility] [facility] [address]  [phone]  Dept: [phone]       Misbah Millan MD  07/18/23 2311

## 2023-07-20 ENCOUNTER — APPOINTMENT (OUTPATIENT)
Dept: CT IMAGING | Facility: HOSPITAL | Age: 83
End: 2023-07-20
Attending: STUDENT IN AN ORGANIZED HEALTH CARE EDUCATION/TRAINING PROGRAM
Payer: COMMERCIAL

## 2023-07-20 ENCOUNTER — HOSPITAL ENCOUNTER (EMERGENCY)
Facility: HOSPITAL | Age: 83
Discharge: HOME | End: 2023-07-20
Attending: FAMILY MEDICINE | Admitting: FAMILY MEDICINE
Payer: COMMERCIAL

## 2023-07-20 VITALS
HEART RATE: 63 BPM | DIASTOLIC BLOOD PRESSURE: 70 MMHG | BODY MASS INDEX: 23.91 KG/M2 | OXYGEN SATURATION: 99 % | SYSTOLIC BLOOD PRESSURE: 151 MMHG | TEMPERATURE: 97.3 F | RESPIRATION RATE: 18 BRPM | HEIGHT: 70 IN | WEIGHT: 167 LBS

## 2023-07-20 DIAGNOSIS — R11.2 NAUSEA AND VOMITING, UNSPECIFIED VOMITING TYPE: ICD-10-CM

## 2023-07-20 LAB
ALBUMIN SERPL BCG-MCNC: 4.7 G/DL (ref 3.5–5.2)
ALBUMIN UR-MCNC: NEGATIVE MG/DL
ALP SERPL-CCNC: 83 U/L (ref 40–129)
ALT SERPL W P-5'-P-CCNC: 27 U/L (ref 0–70)
ANION GAP SERPL CALCULATED.3IONS-SCNC: 10 MMOL/L (ref 7–15)
APPEARANCE UR: CLEAR
AST SERPL W P-5'-P-CCNC: 40 U/L (ref 0–45)
BASOPHILS # BLD AUTO: 0 10E3/UL (ref 0–0.2)
BASOPHILS NFR BLD AUTO: 0 %
BILIRUB SERPL-MCNC: 1.2 MG/DL
BILIRUB UR QL STRIP: NEGATIVE
BUN SERPL-MCNC: 13.1 MG/DL (ref 8–23)
CALCIUM SERPL-MCNC: 9.9 MG/DL (ref 8.8–10.2)
CHLORIDE SERPL-SCNC: 94 MMOL/L (ref 98–107)
COLOR UR AUTO: ABNORMAL
CREAT SERPL-MCNC: 0.82 MG/DL (ref 0.67–1.17)
DEPRECATED HCO3 PLAS-SCNC: 26 MMOL/L (ref 22–29)
EOSINOPHIL # BLD AUTO: 0 10E3/UL (ref 0–0.7)
EOSINOPHIL NFR BLD AUTO: 0 %
ERYTHROCYTE [DISTWIDTH] IN BLOOD BY AUTOMATED COUNT: 11.9 % (ref 10–15)
GFR SERPL CREATININE-BSD FRML MDRD: 87 ML/MIN/1.73M2
GLUCOSE SERPL-MCNC: 104 MG/DL (ref 70–99)
GLUCOSE UR STRIP-MCNC: NEGATIVE MG/DL
HCT VFR BLD AUTO: 41.3 % (ref 40–53)
HGB BLD-MCNC: 14.5 G/DL (ref 13.3–17.7)
HGB UR QL STRIP: ABNORMAL
HOLD SPECIMEN: NORMAL
HOLD SPECIMEN: NORMAL
IMM GRANULOCYTES # BLD: 0 10E3/UL
IMM GRANULOCYTES NFR BLD: 0 %
KETONES UR STRIP-MCNC: 40 MG/DL
LEUKOCYTE ESTERASE UR QL STRIP: NEGATIVE
LIPASE SERPL-CCNC: 20 U/L (ref 13–60)
LYMPHOCYTES # BLD AUTO: 1 10E3/UL (ref 0.8–5.3)
LYMPHOCYTES NFR BLD AUTO: 14 %
MAGNESIUM SERPL-MCNC: 2.1 MG/DL (ref 1.7–2.3)
MCH RBC QN AUTO: 31.9 PG (ref 26.5–33)
MCHC RBC AUTO-ENTMCNC: 35.1 G/DL (ref 31.5–36.5)
MCV RBC AUTO: 91 FL (ref 78–100)
MONOCYTES # BLD AUTO: 0.5 10E3/UL (ref 0–1.3)
MONOCYTES NFR BLD AUTO: 7 %
MUCOUS THREADS #/AREA URNS LPF: PRESENT /LPF
NEUTROPHILS # BLD AUTO: 5.4 10E3/UL (ref 1.6–8.3)
NEUTROPHILS NFR BLD AUTO: 79 %
NITRATE UR QL: NEGATIVE
NRBC # BLD AUTO: 0 10E3/UL
NRBC BLD AUTO-RTO: 0 /100
PH UR STRIP: 6.5 [PH] (ref 5–7)
PLATELET # BLD AUTO: 275 10E3/UL (ref 150–450)
POTASSIUM SERPL-SCNC: 4.4 MMOL/L (ref 3.4–5.3)
PROT SERPL-MCNC: 7.1 G/DL (ref 6.4–8.3)
RBC # BLD AUTO: 4.55 10E6/UL (ref 4.4–5.9)
RBC URINE: 6 /HPF
SODIUM SERPL-SCNC: 130 MMOL/L (ref 136–145)
SP GR UR STRIP: 1.02 (ref 1–1.03)
TROPONIN T SERPL HS-MCNC: 11 NG/L
UROBILINOGEN UR STRIP-MCNC: <2 MG/DL
WBC # BLD AUTO: 6.9 10E3/UL (ref 4–11)
WBC URINE: <1 /HPF

## 2023-07-20 PROCEDURE — 250N000011 HC RX IP 250 OP 636: Mod: JZ | Performed by: FAMILY MEDICINE

## 2023-07-20 PROCEDURE — 36415 COLL VENOUS BLD VENIPUNCTURE: CPT | Performed by: STUDENT IN AN ORGANIZED HEALTH CARE EDUCATION/TRAINING PROGRAM

## 2023-07-20 PROCEDURE — 80053 COMPREHEN METABOLIC PANEL: CPT | Performed by: STUDENT IN AN ORGANIZED HEALTH CARE EDUCATION/TRAINING PROGRAM

## 2023-07-20 PROCEDURE — 81001 URINALYSIS AUTO W/SCOPE: CPT | Performed by: FAMILY MEDICINE

## 2023-07-20 PROCEDURE — 96375 TX/PRO/DX INJ NEW DRUG ADDON: CPT

## 2023-07-20 PROCEDURE — 96374 THER/PROPH/DIAG INJ IV PUSH: CPT

## 2023-07-20 PROCEDURE — 74177 CT ABD & PELVIS W/CONTRAST: CPT

## 2023-07-20 PROCEDURE — 84484 ASSAY OF TROPONIN QUANT: CPT | Performed by: STUDENT IN AN ORGANIZED HEALTH CARE EDUCATION/TRAINING PROGRAM

## 2023-07-20 PROCEDURE — 250N000013 HC RX MED GY IP 250 OP 250 PS 637: Performed by: FAMILY MEDICINE

## 2023-07-20 PROCEDURE — 85025 COMPLETE CBC W/AUTO DIFF WBC: CPT | Performed by: STUDENT IN AN ORGANIZED HEALTH CARE EDUCATION/TRAINING PROGRAM

## 2023-07-20 PROCEDURE — 83690 ASSAY OF LIPASE: CPT | Performed by: STUDENT IN AN ORGANIZED HEALTH CARE EDUCATION/TRAINING PROGRAM

## 2023-07-20 PROCEDURE — 83735 ASSAY OF MAGNESIUM: CPT | Performed by: FAMILY MEDICINE

## 2023-07-20 PROCEDURE — 96361 HYDRATE IV INFUSION ADD-ON: CPT

## 2023-07-20 PROCEDURE — 99285 EMERGENCY DEPT VISIT HI MDM: CPT | Mod: 25

## 2023-07-20 PROCEDURE — 258N000003 HC RX IP 258 OP 636: Performed by: FAMILY MEDICINE

## 2023-07-20 RX ORDER — IOPAMIDOL 755 MG/ML
90 INJECTION, SOLUTION INTRAVASCULAR ONCE
Status: COMPLETED | OUTPATIENT
Start: 2023-07-20 | End: 2023-07-20

## 2023-07-20 RX ORDER — PROCHLORPERAZINE MALEATE 5 MG
5 TABLET ORAL EVERY 6 HOURS PRN
Qty: 8 TABLET | Refills: 0 | Status: SHIPPED | OUTPATIENT
Start: 2023-07-20 | End: 2023-12-05

## 2023-07-20 RX ORDER — MAGNESIUM HYDROXIDE/ALUMINUM HYDROXICE/SIMETHICONE 120; 1200; 1200 MG/30ML; MG/30ML; MG/30ML
15 SUSPENSION ORAL ONCE
Status: COMPLETED | OUTPATIENT
Start: 2023-07-20 | End: 2023-07-20

## 2023-07-20 RX ORDER — FAMOTIDINE 20 MG/1
20 TABLET, FILM COATED ORAL 2 TIMES DAILY
Qty: 14 TABLET | Refills: 0 | Status: SHIPPED | OUTPATIENT
Start: 2023-07-20 | End: 2023-12-05

## 2023-07-20 RX ORDER — ONDANSETRON 2 MG/ML
4 INJECTION INTRAMUSCULAR; INTRAVENOUS ONCE
Status: COMPLETED | OUTPATIENT
Start: 2023-07-20 | End: 2023-07-20

## 2023-07-20 RX ADMIN — ONDANSETRON 4 MG: 2 INJECTION INTRAMUSCULAR; INTRAVENOUS at 17:49

## 2023-07-20 RX ADMIN — IOPAMIDOL 90 ML: 755 INJECTION, SOLUTION INTRAVENOUS at 17:37

## 2023-07-20 RX ADMIN — FAMOTIDINE 20 MG: 10 INJECTION, SOLUTION INTRAVENOUS at 17:49

## 2023-07-20 RX ADMIN — SODIUM CHLORIDE 500 ML: 9 INJECTION, SOLUTION INTRAVENOUS at 17:52

## 2023-07-20 RX ADMIN — ALUMINUM HYDROXIDE, MAGNESIUM HYDROXIDE, AND SIMETHICONE 15 ML: 200; 200; 20 SUSPENSION ORAL at 17:49

## 2023-07-20 ASSESSMENT — ENCOUNTER SYMPTOMS
ABDOMINAL PAIN: 0
VOMITING: 1
FEVER: 0
DYSURIA: 0
NAUSEA: 1
LIGHT-HEADEDNESS: 0
HEMATURIA: 0
FREQUENCY: 0
DIARRHEA: 0
DIZZINESS: 0

## 2023-07-20 ASSESSMENT — ACTIVITIES OF DAILY LIVING (ADL)
ADLS_ACUITY_SCORE: 35
ADLS_ACUITY_SCORE: 35

## 2023-07-20 NOTE — ED TRIAGE NOTES
The pt presents for evaluation of N/V. He was seen two days ago with he same symptoms and discharged with PO Zofran. No relief. Vomiting has persisted.

## 2023-07-20 NOTE — ED PROVIDER NOTES
EMERGENCY DEPARTMENT ENCOUNTER      NAME: Roscoe Merida  AGE: 83 year old male  YOB: 1940  MRN: [id number]  EVALUATION DATE & TIME: 7/20/2023  4:35 PM    PCP: [facility]    ED PROVIDER: Jake Villasenor M.D.    Chief Complaint   Patient presents with     Vomiting       FINAL IMPRESSION:  1. Nausea and vomiting, unspecified vomiting type        ED COURSE & MEDICAL DECISION MAKING:    Pertinent Labs & Imaging studies independently interpreted by me. (See chart for details)  4:59 PM  Reviewed recent emergency department visit on July 18 when patient was seen with vomiting and diagnosed with possible sinusitis, at that time CT scan of the head was performed which was negative, basic panel was performed which was normal, hepatic panel was normal, CRP was negative, hepatic panel normal, CBC normal.  Patient returns today with continued vomiting.  Labs today independently interpreted by me demonstrate slightly low sodium, otherwise basic panel is reassuring, hepatic panel is normal, lipase is normal, troponin is 11 which gender adjusted is normal for symptoms lasting greater than 6 hours, CBC is reassuring.  5:35 PM CT scan of the abdomen and pelvis independently interpreted by me is negative for acute intra-abdominal findings to explain patient's symptoms, evidence for obstruction, pancreatitis, colitis, ureteral stone.  Patient seen and examined.  Differential diagnosis includes but not limited to gastritis, pancreatitis, hepatitis, duodenitis, bowel obstruction, urinary tract infection, colitis, myocardial infarction, increased intracranial pressure, electrolyte disturbance, DKA, alcohol intoxication, alcohol withdrawal, medication reaction.  Patient presents with vomiting which was going on a couple of days.  On exam, vitally stable, denies chest pain, shortness of breath, abdominal pain, diarrhea.  No lightheadedness or dizziness.  Clinically no evidence for dehydration and labs do not  demonstrate any abnormalities to suggest severe dehydration or acute kidney injury.  No abdominal tenderness on exam and CT scan of the abdomen and pelvis is negative.  CT scan done a couple days ago of the head was negative as well.  Urinalysis and magnesium ordered, patient was given Zofran, Maalox, and Pepcid and oral challenge.  Anticipate discharge if patient is able to tolerate oral intake.  6:46 PM urinalysis independently interpreted by me does not demonstrate any evidence for infection.  Magnesium is 2.1.  Patient was requested to be discharged.  Will start Pepcid and plan to discharge.    At the conclusion of the encounter I discussed the results of all of the tests and the disposition. The questions were answered. The patient or family acknowledged understanding and was agreeable with the care plan.     Medical Decision Making    History:    Supplemental history from: Family Member/Significant Other    External Record(s) reviewed: Documented in chart, if applicable.    Work Up:    Chart documentation includes differential considered and any EKGs or imaging independently interpreted by provider, where specified.    In additional to work up documented, I considered the following work up: Documented in chart, if applicable.    External consultation:    Discussion of management with another provider: Documented in chart, if applicable    Complicating factors:    Care impacted by chronic illness: N/A    Care affected by social determinants of health: Access to Affordable Health Care    Disposition considerations: Discharge. I prescribed additional prescription strength medication(s) as charted. I considered admission, but discharged patient after significant clinical improvement.    MEDICATIONS GIVEN IN THE EMERGENCY:  Medications   iopamidol (ISOVUE-370) solution 90 mL (90 mLs Intravenous $Given 7/20/23 5074)   ondansetron (ZOFRAN) injection 4 mg (4 mg Intravenous $Given 7/20/23 5455)   alum & mag  hydroxide-simethicone (MAALOX) suspension 15 mL (15 mLs Oral $Given 7/20/23 1749)   famotidine (PEPCID) injection 20 mg (20 mg Intravenous $Given 7/20/23 1749)   0.9% sodium chloride BOLUS (0 mLs Intravenous Stopped 7/20/23 1828)       NEW PRESCRIPTIONS STARTED AT TODAY'S ER VISIT  New Prescriptions    FAMOTIDINE (PEPCID) 20 MG TABLET    Take 1 tablet (20 mg) by mouth 2 times daily    PROCHLORPERAZINE (COMPAZINE) 5 MG TABLET    Take 1 tablet (5 mg) by mouth every 6 hours as needed for nausea or vomiting       =================================================================    HPI    Patient information was obtained from: patient       Roscoe Merida is a 83 year old male with a pertinent history of GERD and prostatic hyperplasia who presents to this ED via walk-in for evaluation of vomiting.    The patient presents with nausea and vomiting for the last 2 days. He has been unable to hold any food down and is not drinking very much either. He has associated weakness. He was seen 2 days ago and prescribed PO Zofran but did not like the taste and says that it has not worked. He has not tired any other medications. He is allergic to penicillins. He lives in his own apartment and does not think he has had any sick contacts. His nephew is here with him today.     He denies diarrhea, abdominal pain, dysuria, urinary frequency, dizziness, lightheaded, hematuria, leg swelling, fever, or any other complaints at this time.       REVIEW OF SYSTEMS   Review of Systems   Constitutional: Negative for fever.   Cardiovascular: Negative for leg swelling.   Gastrointestinal: Positive for nausea and vomiting. Negative for abdominal pain and diarrhea.   Genitourinary: Negative for dysuria, frequency and hematuria.   Neurological: Negative for dizziness and light-headedness.   All other systems reviewed and are negative.     All other systems reviewed and negative    PAST MEDICAL HISTORY:  No past medical history on file.    PAST  "SURGICAL HISTORY:  Past Surgical History:   Procedure Laterality Date     TONSILLECTOMY & ADENOIDECTOMY         CURRENT MEDICATIONS:    No current facility-administered medications for this encounter.     Current Outpatient Medications   Medication     famotidine (PEPCID) 20 MG tablet     prochlorperazine (COMPAZINE) 5 MG tablet     ascorbic Acid (VITAMIN C) 500 mg CpER     aspirin 81 MG EC tablet     azithromycin (ZITHROMAX Z-FANNY) 250 MG tablet     diphenhydrAMINE (BENADRYL) 25 mg tablet     doxycycline (VIBRAMYCIN) 100 MG capsule     HYDROcodone-acetaminophen 5-325 mg per tablet     hydrocortisone (PROCTOSOL HC) 2.5 % rectal cream     MULTIVITAMIN ORAL     omeprazole (PRILOSEC) 20 MG capsule     ondansetron (ZOFRAN ODT) 4 MG ODT tab     promethazine (PHENERGAN) 25 MG tablet     triamcinolone (KENALOG) 0.1 % ointment       ALLERGIES:  Allergies   Allergen Reactions     Penicillins Hives       FAMILY HISTORY:  Family History   Problem Relation Age of Onset     Heart Disease Father      Parkinsonism Brother        SOCIAL HISTORY:   Social History     Socioeconomic History     Marital status: Patient Declined   Tobacco Use     Smoking status: Former   Substance and Sexual Activity     Alcohol use: No     Drug use: No       VITALS:  BP (!) 151/70   Pulse 63   Temp 97.3  F (36.3  C) (Temporal)   Resp 18   Ht 1.778 m (5' 10\")   Wt 75.8 kg (167 lb)   SpO2 99%   BMI 23.96 kg/m      PHYSICAL EXAM:  Physical Exam  Vitals and nursing note reviewed.   Constitutional:       Appearance: Normal appearance.   HENT:      Head: Normocephalic and atraumatic.      Right Ear: External ear normal.      Left Ear: External ear normal.      Nose: Nose normal.      Mouth/Throat:      Mouth: Mucous membranes are moist.   Eyes:      Extraocular Movements: Extraocular movements intact.      Conjunctiva/sclera: Conjunctivae normal.      Pupils: Pupils are equal, round, and reactive to light.   Cardiovascular:      Rate and Rhythm: Normal " rate and regular rhythm.   Pulmonary:      Effort: Pulmonary effort is normal.      Breath sounds: Normal breath sounds. No wheezing or rales.   Abdominal:      General: Abdomen is flat. There is no distension.      Palpations: Abdomen is soft.      Tenderness: There is no abdominal tenderness. There is no guarding.   Musculoskeletal:         General: Normal range of motion.      Cervical back: Normal range of motion and neck supple.      Right lower leg: No edema.      Left lower leg: No edema.   Lymphadenopathy:      Cervical: No cervical adenopathy.   Skin:     General: Skin is warm and dry.   Neurological:      General: No focal deficit present.      Mental Status: He is alert and oriented to person, place, and time. Mental status is at baseline.      Comments: No gross focal neurologic deficits   Psychiatric:         Mood and Affect: Mood normal.         Behavior: Behavior normal.         Thought Content: Thought content normal.          LAB:  All pertinent labs reviewed and interpreted.  Results for orders placed or performed during the hospital encounter of 07/20/23   CT Abdomen Pelvis w Contrast    Impression    IMPRESSION:   No acute findings or other explanation for symptoms.   Comprehensive metabolic panel   Result Value Ref Range    Sodium 130 (L) 136 - 145 mmol/L    Potassium 4.4 3.4 - 5.3 mmol/L    Chloride 94 (L) 98 - 107 mmol/L    Carbon Dioxide (CO2) 26 22 - 29 mmol/L    Anion Gap 10 7 - 15 mmol/L    Urea Nitrogen 13.1 8.0 - 23.0 mg/dL    Creatinine 0.82 0.67 - 1.17 mg/dL    Calcium 9.9 8.8 - 10.2 mg/dL    Glucose 104 (H) 70 - 99 mg/dL    Alkaline Phosphatase 83 40 - 129 U/L    AST 40 0 - 45 U/L    ALT 27 0 - 70 U/L    Protein Total 7.1 6.4 - 8.3 g/dL    Albumin 4.7 3.5 - 5.2 g/dL    Bilirubin Total 1.2 <=1.2 mg/dL    GFR Estimate 87 >60 mL/min/1.73m2   Result Value Ref Range    Troponin T, High Sensitivity 11 <=22 ng/L   Result Value Ref Range    Lipase 20 13 - 60 U/L   CBC with platelets and  differential   Result Value Ref Range    WBC Count 6.9 4.0 - 11.0 10e3/uL    RBC Count 4.55 4.40 - 5.90 10e6/uL    Hemoglobin 14.5 13.3 - 17.7 g/dL    Hematocrit 41.3 40.0 - 53.0 %    MCV 91 78 - 100 fL    MCH 31.9 26.5 - 33.0 pg    MCHC 35.1 31.5 - 36.5 g/dL    RDW 11.9 10.0 - 15.0 %    Platelet Count 275 150 - 450 10e3/uL    % Neutrophils 79 %    % Lymphocytes 14 %    % Monocytes 7 %    % Eosinophils 0 %    % Basophils 0 %    % Immature Granulocytes 0 %    NRBCs per 100 WBC 0 <1 /100    Absolute Neutrophils 5.4 1.6 - 8.3 10e3/uL    Absolute Lymphocytes 1.0 0.8 - 5.3 10e3/uL    Absolute Monocytes 0.5 0.0 - 1.3 10e3/uL    Absolute Eosinophils 0.0 0.0 - 0.7 10e3/uL    Absolute Basophils 0.0 0.0 - 0.2 10e3/uL    Absolute Immature Granulocytes 0.0 <=0.4 10e3/uL    Absolute NRBCs 0.0 10e3/uL   Extra Blue Top Tube   Result Value Ref Range    Hold Specimen JIC    Extra Red Top Tube   Result Value Ref Range    Hold Specimen JIC    Result Value Ref Range    Magnesium 2.1 1.7 - 2.3 mg/dL   UA with Microscopic reflex to Culture    Specimen: Urine, Midstream   Result Value Ref Range    Color Urine Light Yellow Colorless, Straw, Light Yellow, Yellow    Appearance Urine Clear Clear    Glucose Urine Negative Negative mg/dL    Bilirubin Urine Negative Negative    Ketones Urine 40 (A) Negative mg/dL    Specific Gravity Urine 1.025 1.001 - 1.030    Blood Urine 0.06 mg/dL (A) Negative    pH Urine 6.5 5.0 - 7.0    Protein Albumin Urine Negative Negative mg/dL    Urobilinogen Urine <2.0 <2.0 mg/dL    Nitrite Urine Negative Negative    Leukocyte Esterase Urine Negative Negative    Mucus Urine Present (A) None Seen /LPF    RBC Urine 6 (H) <=2 /HPF    WBC Urine <1 <=5 /HPF       RADIOLOGY:  Reviewed all pertinent imaging. Please see official radiology report.  CT Abdomen Pelvis w Contrast   Final Result   IMPRESSION:    No acute findings or other explanation for symptoms.          IBen, am serving as a scribe to document  services personally performed by Dr. Villasenor based on my observation and the provider's statements to me. I, Jake Villasenor MD attest that Ben Morejon is acting in a scribe capacity, has observed my performance of the services and has documented them in accordance with my direction.    Jake Villasenor M.D.  Emergency Medicine  [facility] EMERGENCY DEPARTMENT  [address] [place] [id number]  [phone]  Dept: [phone]     Jake Villasenor MD  07/20/23 0846

## 2023-08-06 ENCOUNTER — HEALTH MAINTENANCE LETTER (OUTPATIENT)
Age: 83
End: 2023-08-06

## 2023-10-20 ENCOUNTER — TELEPHONE (OUTPATIENT)
Dept: FAMILY MEDICINE | Facility: CLINIC | Age: 83
End: 2023-10-20
Payer: COMMERCIAL

## 2023-10-20 NOTE — TELEPHONE ENCOUNTER
Reason for call:  Other     Patient called regarding (reason for call): call back    Additional comments: Pt is a former pt of Dr. Shankar and would like to re establish care. Please let pt know if he can schedule.    Phone number to reach patient:  Cell number on file:    Telephone Information:   Mobile [phone]       Best Time:  Any    Can we leave a detailed message on this number?  YES

## 2023-10-23 ENCOUNTER — TELEPHONE (OUTPATIENT)
Dept: FAMILY MEDICINE | Facility: CLINIC | Age: 83
End: 2023-10-23
Payer: COMMERCIAL

## 2023-10-23 DIAGNOSIS — E78.00 HYPERCHOLESTEREMIA: Primary | ICD-10-CM

## 2023-10-23 DIAGNOSIS — K21.9 GASTROESOPHAGEAL REFLUX DISEASE WITHOUT ESOPHAGITIS: ICD-10-CM

## 2023-10-23 RX ORDER — ATORVASTATIN CALCIUM 10 MG/1
10 TABLET, FILM COATED ORAL DAILY
Qty: 90 TABLET | Refills: 0 | Status: SHIPPED | OUTPATIENT
Start: 2023-10-23 | End: 2024-01-18

## 2023-10-23 NOTE — TELEPHONE ENCOUNTER
Patient is scheduled for 12/05 to establish care with . Patient is needing these medications refilled before then Omeprazole 20 mg and Atorvastion 10 mg. Patient would like these medications sent to the [facility] pharmacy off of [address] in [place]. Please contact the patient at [phone]. If calling on 10/24 patient is requesting that he be called before 12:00pm or after 2:00 pm.

## 2023-12-05 ENCOUNTER — OFFICE VISIT (OUTPATIENT)
Dept: FAMILY MEDICINE | Facility: CLINIC | Age: 83
End: 2023-12-05
Payer: COMMERCIAL

## 2023-12-05 VITALS
WEIGHT: 177 LBS | TEMPERATURE: 98 F | DIASTOLIC BLOOD PRESSURE: 70 MMHG | OXYGEN SATURATION: 97 % | HEART RATE: 60 BPM | SYSTOLIC BLOOD PRESSURE: 138 MMHG | RESPIRATION RATE: 16 BRPM | BODY MASS INDEX: 25.4 KG/M2

## 2023-12-05 DIAGNOSIS — R73.01 IMPAIRED FASTING GLUCOSE: ICD-10-CM

## 2023-12-05 DIAGNOSIS — E78.00 HYPERCHOLESTEREMIA: Primary | ICD-10-CM

## 2023-12-05 DIAGNOSIS — H10.13 ALLERGIC CONJUNCTIVITIS, BILATERAL: ICD-10-CM

## 2023-12-05 DIAGNOSIS — L21.9 SEBORRHEIC DERMATITIS: ICD-10-CM

## 2023-12-05 DIAGNOSIS — R68.89 DIFFICULTY TRANSFERRING: ICD-10-CM

## 2023-12-05 DIAGNOSIS — R14.3 FLATULENCE, ERUCTATION AND GAS PAIN: ICD-10-CM

## 2023-12-05 DIAGNOSIS — H91.93 DECREASED HEARING OF BOTH EARS: ICD-10-CM

## 2023-12-05 DIAGNOSIS — R14.2 FLATULENCE, ERUCTATION AND GAS PAIN: ICD-10-CM

## 2023-12-05 DIAGNOSIS — R14.1 FLATULENCE, ERUCTATION AND GAS PAIN: ICD-10-CM

## 2023-12-05 DIAGNOSIS — E87.1 HYPONATREMIA: ICD-10-CM

## 2023-12-05 DIAGNOSIS — N40.1 BPH WITH URINARY OBSTRUCTION: ICD-10-CM

## 2023-12-05 DIAGNOSIS — N13.8 BPH WITH URINARY OBSTRUCTION: ICD-10-CM

## 2023-12-05 DIAGNOSIS — K21.9 GASTROESOPHAGEAL REFLUX DISEASE WITHOUT ESOPHAGITIS: ICD-10-CM

## 2023-12-05 LAB
ANION GAP SERPL CALCULATED.3IONS-SCNC: 8 MMOL/L (ref 7–15)
BUN SERPL-MCNC: 12.9 MG/DL (ref 8–23)
CALCIUM SERPL-MCNC: 9.6 MG/DL (ref 8.8–10.2)
CHLORIDE SERPL-SCNC: 103 MMOL/L (ref 98–107)
CHOLEST SERPL-MCNC: 179 MG/DL
CREAT SERPL-MCNC: 0.9 MG/DL (ref 0.67–1.17)
DEPRECATED HCO3 PLAS-SCNC: 29 MMOL/L (ref 22–29)
EGFRCR SERPLBLD CKD-EPI 2021: 84 ML/MIN/1.73M2
GLUCOSE SERPL-MCNC: 94 MG/DL (ref 70–99)
HBA1C MFR BLD: 5.2 % (ref 0–5.6)
HDLC SERPL-MCNC: 75 MG/DL
LDLC SERPL CALC-MCNC: 89 MG/DL
NONHDLC SERPL-MCNC: 104 MG/DL
POTASSIUM SERPL-SCNC: 4.8 MMOL/L (ref 3.4–5.3)
SODIUM SERPL-SCNC: 140 MMOL/L (ref 135–145)
TRIGL SERPL-MCNC: 75 MG/DL

## 2023-12-05 PROCEDURE — 83036 HEMOGLOBIN GLYCOSYLATED A1C: CPT | Performed by: FAMILY MEDICINE

## 2023-12-05 PROCEDURE — 80048 BASIC METABOLIC PNL TOTAL CA: CPT | Performed by: FAMILY MEDICINE

## 2023-12-05 PROCEDURE — 80061 LIPID PANEL: CPT | Performed by: FAMILY MEDICINE

## 2023-12-05 PROCEDURE — 36415 COLL VENOUS BLD VENIPUNCTURE: CPT | Performed by: FAMILY MEDICINE

## 2023-12-05 PROCEDURE — 99204 OFFICE O/P NEW MOD 45 MIN: CPT | Performed by: FAMILY MEDICINE

## 2023-12-05 RX ORDER — RESPIRATORY SYNCYTIAL VIRUS VACCINE 120MCG/0.5
0.5 KIT INTRAMUSCULAR ONCE
Qty: 1 EACH | Refills: 0 | Status: CANCELLED | OUTPATIENT
Start: 2023-12-05 | End: 2023-12-05

## 2023-12-05 RX ORDER — TAMSULOSIN HYDROCHLORIDE 0.4 MG/1
0.4 CAPSULE ORAL DAILY
Qty: 90 CAPSULE | Refills: 3 | Status: SHIPPED | OUTPATIENT
Start: 2023-12-05 | End: 2024-03-04

## 2023-12-05 RX ORDER — FLUTICASONE PROPIONATE 50 MCG
SPRAY, SUSPENSION (ML) NASAL
COMMUNITY
Start: 2023-07-17

## 2023-12-05 RX ORDER — OLOPATADINE HYDROCHLORIDE 1 MG/ML
1 SOLUTION/ DROPS OPHTHALMIC 2 TIMES DAILY
Qty: 5 ML | Refills: 3 | Status: SHIPPED | OUTPATIENT
Start: 2023-12-05

## 2023-12-05 ASSESSMENT — PAIN SCALES - GENERAL: PAINLEVEL: NO PAIN (0)

## 2023-12-05 NOTE — LETTER
December 5, 2023      Roscoe Merida  [address]    [facility] [address]        To Whom It May Concern,     Please provide elevated toilet for seat elevation > 16 inches due to difficulty using standard (14 in) toilet.      Sincerely,        Ramesh Shankar MD

## 2023-12-05 NOTE — PROGRESS NOTES
Assessment/Plan:    Hypercholesteremia  Hypercholesterolemia.  Atorvastatin 10 mg daily.  Nonfasting.  Check lipid cascade today.  Reassess at annual wellness visit in 6 months.  BMI 25.4.  Maintain weight less than 180 pounds.  - Lipid panel reflex to direct LDL Non-fasting    Gastroesophageal reflux disease without esophagitis  Gastroesophageal reflux with increased belching and flatulence.  Increase omeprazole from 20 mg to 40 mg daily.  Dietary monitoring with food diary to determine if component of fructose intolerance, lactose intolerance etc.    Flatulence, eructation and gas pain  As above, recommend food diary to further evaluate for potential food triggers.  Increase omeprazole from 20 mg up to 40 mg daily.    Decreased hearing of both ears  Bilateral hearing loss noted.  Consider audiology referral if patient elects.    Impaired fasting glucose  Impaired fasting glucose.  A1c, nonfasting.  - Hemoglobin A1c    Allergic conjunctivitis, bilateral  Trial of refresh artificial tears or Patanol 0.1% ophthalmic solution using 1 drop both eyes twice daily.  - olopatadine (PATANOL) 0.1 % ophthalmic solution  Dispense: 5 mL; Refill: 3    Hyponatremia  Hyponatremia with prior sodium 130 and will update basic metabolic panel to ensure stable or resolved.  - Basic metabolic panel    Difficulty transferring  Difficulty transferring to toilet.  Toilet described as 14 inches and needs toilet seat at least 16 inches or higher above the floor.  Letter was provided for his residence.    Seborrheic dermatitis  Seborrheic dermatitis involving nasolabial folds and eyebrows.  Hydrocortisone 1% cream twice daily as needed.    BPH with urinary obstruction  BPH with urinary obstruction has tried tamsulosin 0.4 mg, finasteride 5 mg and terazosin 10 mg previously.  Will restart tamsulosin 0.4 mg daily and reassess at annual wellness visit in the next 3 to 6 months.  - tamsulosin (FLOMAX) 0.4 MG capsule  Dispense: 90 capsule; Refill:  "3    40 minutes total time spent on the date of encounter including patient chart review, counseling and coordination of cares, and documentation.         Subjective:    Roscoe Merida is seen today for establishment of care.  Had been receiving care outside of this office through [facility] etc.  Patient was remarried and second wife passed away 2021.  Patient describes history of hyperlipidemia treated with atorvastatin 10 mg daily.  Omeprazole 20 mg daily for reflux.  No longer using famotidine.  Does have belching as well as flatulence issues.  Seems to be food related however uncertain trigger.  Hearing loss bilateral symmetric.  Insomnia.  Uses diphenhydramine 25 mg using 1 to 2 tablets at bedtime.  BPH with urinary obstruction.  Stream seems relatively normal until the end and then difficulty emptying with dribbling.  Has used tamsulosin 0.4 mg daily, finasteride 5 mg daily and terazosin 10 mg at bedtime however not utilizing any of these medicines right now.  Itchy eyes worse in the morning in the evening.  Also has had low sodium at 130.  Scaling noted along nasolabial folds as well as eyebrows.  Difficulty with transfers to toilet seat measured 14 inches above the floor and would prefer 16 or higher inches if possible.  Comprehensive review of systems as above otherwise all negative.       x 20 - Betsey ( x 2016)   x  (prior  \"Connie\" x 73 - benign brain tumor, then PE noted following surgery, then seizure occurred...)  2 sons - Neftali and Justin   1 step-son - David, 1 step-daughter - Gladys   No smoke - quit ~ 39 y.o. after ~ 25 pack-year   EtOH occ beer   Surgeries: T & A as child   Hospitalizations: none   Mom -  (phlebitis)   Dad -  (minor heart attacks)   2 sis - arthritis   1 bro - Parkinson's   Work: unemployeed (can't find a job due to age) - prior \"runner\" with [employer] [phone]   Genaro Benavides (takes " "part in [employer])   Colonoscopy 6/7/11 with \"advance adenoma\" (tubulovillous) with recommendation for repeat in 6 months however patient cannot afford (insurance will only cover q 2 years) - subsequent colonoscopy with tubular adenoma January 14, 2014 and told to repeat 5 years.       Past Surgical History:   Procedure Laterality Date    TONSILLECTOMY & ADENOIDECTOMY          Family History   Problem Relation Age of Onset    Heart Disease Father     Parkinsonism Brother         No past medical history on file.     Social History     Tobacco Use    Smoking status: Former   Substance Use Topics    Alcohol use: No    Drug use: No        Current Outpatient Medications   Medication Sig Dispense Refill    ascorbic Acid (VITAMIN C) 500 mg CpER [ASCORBIC ACID (VITAMIN C) 500 MG CPER] Take 1,000 mg by mouth daily.      aspirin 81 MG EC tablet [ASPIRIN 81 MG EC TABLET] Take 81 mg by mouth daily.      atorvastatin (LIPITOR) 10 MG tablet Take 1 tablet (10 mg) by mouth daily 90 tablet 0    fluticasone (FLONASE) 50 MCG/ACT nasal spray SPRAY 2 SPRAYS IN EACH NOSTRIL EVERY DAY      hydrocortisone (PROCTOSOL HC) 2.5 % rectal cream [HYDROCORTISONE (PROCTOSOL HC) 2.5 % RECTAL CREAM] APPLY TO ANAL AREA TWO TIMES A DAY AS NEEDED FOR IRRITATION 85.05 g 3    MULTIVITAMIN ORAL [MULTIVITAMIN ORAL] Take 1 tablet by mouth daily.      olopatadine (PATANOL) 0.1 % ophthalmic solution Place 1 drop into both eyes 2 times daily 5 mL 3    omeprazole (PRILOSEC) 20 MG DR capsule Take 1 capsule (20 mg) by mouth daily 90 capsule 0    tamsulosin (FLOMAX) 0.4 MG capsule Take 1 capsule (0.4 mg) by mouth daily 90 capsule 3    diphenhydrAMINE (BENADRYL) 25 mg tablet [DIPHENHYDRAMINE (BENADRYL) 25 MG TABLET] Take 25 mg by mouth. (Patient not taking: Reported on 12/5/2023)            Objective:    Vitals:    12/05/23 0957   BP: 138/70   Pulse: 60   Resp: 16   Temp: 98  F (36.7  C)   SpO2: 97%   Weight: 80.3 kg (177 lb)      Body mass index is 25.4 " kg/m .    Alert.  No apparent distress.  Nasolabial folds and eyebrows with slight scaling consistent with seborrheic dermatitis.  Chest clear.  Cardiac exam regular.  Extremities warm and dry.  Abdomen nondistended.      This note has been dictated using voice recognition software and as a result may contain minor grammatical errors and unintended word substitutions.           Answers submitted by the patient for this visit:  General Questionnaire (Submitted on 12/5/2023)  Chief Complaint: Chronic problems general questions HPI Form  What is the reason for your visit today? : general check up  How many servings of fruits and vegetables do you eat daily?: 0-1  On average, how many sweetened beverages do you drink each day (Examples: soda, juice, sweet tea, etc.  Do NOT count diet or artificially sweetened beverages)?: 1  How many minutes a day do you exercise enough to make your heart beat faster?: 10 to 19  How many days a week do you exercise enough to make your heart beat faster?: 6  How many days per week do you miss taking your medication?: 0

## 2023-12-08 DIAGNOSIS — K62.89 PROCTITIS: ICD-10-CM

## 2023-12-08 RX ORDER — HYDROCORTISONE 2.5 %
CREAM (GRAM) TOPICAL 2 TIMES DAILY
Qty: 30 G | Refills: 3 | Status: SHIPPED | OUTPATIENT
Start: 2023-12-08

## 2023-12-08 NOTE — TELEPHONE ENCOUNTER
Pending Prescriptions:                       Disp   Refills    hydrocortisone 2.5 % cream                                  Pt states that he forgot to mention this RX last time he was in to see Dr. Shankar.

## 2024-01-18 ENCOUNTER — TELEPHONE (OUTPATIENT)
Dept: FAMILY MEDICINE | Facility: CLINIC | Age: 84
End: 2024-01-18
Payer: COMMERCIAL

## 2024-01-18 DIAGNOSIS — E78.00 HYPERCHOLESTEREMIA: ICD-10-CM

## 2024-01-18 DIAGNOSIS — K21.9 GASTROESOPHAGEAL REFLUX DISEASE WITHOUT ESOPHAGITIS: ICD-10-CM

## 2024-01-18 RX ORDER — ATORVASTATIN CALCIUM 10 MG/1
10 TABLET, FILM COATED ORAL DAILY
Qty: 90 TABLET | Refills: 0 | Status: SHIPPED | OUTPATIENT
Start: 2024-01-18 | End: 2024-05-06

## 2024-01-18 NOTE — TELEPHONE ENCOUNTER
Medication Question or Refill    Contacts         Type Contact Phone/Fax    01/18/2024 03:57 PM CST Interface (Incoming) [id number] - [place] [[facility] [place] - [address] [phone]    01/18/2024 04:02 PM CST Phone (Incoming) Roscoe Merida (Self) [phone] (M)            What medication are you calling about (include dose and sig)?:   hydrocortisone 2.5 % cream     Preferred Pharmacy:     [facility] #[id number] - [place] [[facility]], [place] - [address]  [facility] [facility] [address]  Phone: [phone] Fax: [phone]      Controlled Substance Agreement on file:   CSA -- Patient Level:    CSA: None found at the patient level.       Who prescribed the medication?: Dr Shankar    Do you need a refill? Yes, 2 tubes    When did you use the medication last? Daily    Patient offered an appointment? No    Do you have any questions or concerns?  Yes: Pt's Pharmacy already called to request 2 of his prescriptions but Pt is now calling to add a 3rd Rx to that. He is hoping for 2 tubes to be filled rather than 1. Please call Pt with any questions. Thank you!      Could we send this information to you in TapTap or would you prefer to receive a phone call?:   Patient would prefer a phone call   Okay to leave a detailed message?: Yes at Cell number on file:    Telephone Information:   Mobile [phone]

## 2024-02-26 ENCOUNTER — TELEPHONE (OUTPATIENT)
Dept: FAMILY MEDICINE | Facility: CLINIC | Age: 84
End: 2024-02-26
Payer: COMMERCIAL

## 2024-02-26 NOTE — TELEPHONE ENCOUNTER
Reason for call:  Other     Patient called regarding (reason for call):  Handicap Parking Pass    Additional comments: Patient is calling and asking if he can get a handicap parking pass patient was told to call his provider to receive a parking pass. Please advise and call patient back with updates please and thank you.    Phone number to reach patient:  Cell number on file:    Telephone Information:   Mobile [phone]       Best Time:  any    Can we leave a detailed message on this number?  YES

## 2024-03-04 ENCOUNTER — TELEPHONE (OUTPATIENT)
Dept: FAMILY MEDICINE | Facility: CLINIC | Age: 84
End: 2024-03-04
Payer: COMMERCIAL

## 2024-03-04 DIAGNOSIS — N40.1 BPH WITH URINARY OBSTRUCTION: ICD-10-CM

## 2024-03-04 DIAGNOSIS — N13.8 BPH WITH URINARY OBSTRUCTION: ICD-10-CM

## 2024-03-04 RX ORDER — TAMSULOSIN HYDROCHLORIDE 0.4 MG/1
0.8 CAPSULE ORAL DAILY
Qty: 180 CAPSULE | Refills: 3 | Status: SHIPPED | OUTPATIENT
Start: 2024-03-04 | End: 2024-05-06

## 2024-03-04 NOTE — TELEPHONE ENCOUNTER
As requested I increased the medication to 0.8 mg, 2 of the tamsulosin 0.4 mg capsules in the evening

## 2024-03-04 NOTE — TELEPHONE ENCOUNTER
Medication Question or Refill    Contacts         Type Contact Phone/Fax    03/04/2024 01:25 PM CST Phone (Incoming) Roscoe Merida (Self) [phone] (M)            What medication are you calling about (include dose and sig)?: Tamsulosin 0.4 mg HCI     Preferred Pharmacy:   [facility] - [place], [place] - [address]  [address]  [facility] [address]  Phone: [phone] Fax: [phone]    [id number] [[facility] [place] - [address] [place] [address] [address] [address]  Phone: [phone] Fax: [phone]    [facility] [address] [place] [address] [address] [place]  [address] [place] [address]  Phone: [phone] Fax: [phone]      Controlled Substance Agreement on file:   CSA -- Patient Level:    CSA: None found at the patient level.       Who prescribed the medication?: Dr. Shankar     Do you need a refill? No    When did you use the medication last? 03/04/2024    Patient offered an appointment? No    Do you have any questions or concerns?  Yes: Patient is calling and asking if he can get a increase on this medication Tamsulosin. Patient states he does feel like the medication is working so asking if the dose can be increased. Please advise and call patient back with updates please and thank you.      Could we send this information to you in Boomr or would you prefer to receive a phone call?:   Patient would prefer a phone call   Okay to leave a detailed message?: Yes at Cell number on file:    Telephone Information:   Mobile [phone]      no

## 2024-05-06 DIAGNOSIS — K21.9 GASTROESOPHAGEAL REFLUX DISEASE WITHOUT ESOPHAGITIS: ICD-10-CM

## 2024-05-06 DIAGNOSIS — N13.8 BPH WITH URINARY OBSTRUCTION: ICD-10-CM

## 2024-05-06 DIAGNOSIS — N40.1 BPH WITH URINARY OBSTRUCTION: ICD-10-CM

## 2024-05-06 DIAGNOSIS — E78.00 HYPERCHOLESTEREMIA: ICD-10-CM

## 2024-05-06 RX ORDER — TAMSULOSIN HYDROCHLORIDE 0.4 MG/1
0.8 CAPSULE ORAL DAILY
Qty: 180 CAPSULE | Refills: 3 | Status: CANCELLED | OUTPATIENT
Start: 2024-05-06

## 2024-05-06 RX ORDER — TAMSULOSIN HYDROCHLORIDE 0.4 MG/1
0.8 CAPSULE ORAL DAILY
Qty: 180 CAPSULE | Refills: 2 | Status: SHIPPED | OUTPATIENT
Start: 2024-05-06

## 2024-05-06 RX ORDER — ATORVASTATIN CALCIUM 10 MG/1
10 TABLET, FILM COATED ORAL DAILY
Qty: 90 TABLET | Refills: 1 | Status: SHIPPED | OUTPATIENT
Start: 2024-05-06 | End: 2024-07-26

## 2024-06-05 ENCOUNTER — OFFICE VISIT (OUTPATIENT)
Dept: FAMILY MEDICINE | Facility: CLINIC | Age: 84
End: 2024-06-05
Payer: COMMERCIAL

## 2024-06-05 VITALS
RESPIRATION RATE: 15 BRPM | OXYGEN SATURATION: 97 % | DIASTOLIC BLOOD PRESSURE: 70 MMHG | SYSTOLIC BLOOD PRESSURE: 130 MMHG | BODY MASS INDEX: 25.33 KG/M2 | HEIGHT: 69 IN | WEIGHT: 171 LBS | HEART RATE: 67 BPM | TEMPERATURE: 97.9 F

## 2024-06-05 DIAGNOSIS — G47.00 INSOMNIA, UNSPECIFIED TYPE: ICD-10-CM

## 2024-06-05 DIAGNOSIS — H91.93 DECREASED HEARING OF BOTH EARS: ICD-10-CM

## 2024-06-05 DIAGNOSIS — E78.00 HYPERCHOLESTEREMIA: ICD-10-CM

## 2024-06-05 DIAGNOSIS — N13.8 BPH WITH URINARY OBSTRUCTION: ICD-10-CM

## 2024-06-05 DIAGNOSIS — R73.01 IMPAIRED FASTING GLUCOSE: ICD-10-CM

## 2024-06-05 DIAGNOSIS — K21.9 GASTROESOPHAGEAL REFLUX DISEASE WITHOUT ESOPHAGITIS: ICD-10-CM

## 2024-06-05 DIAGNOSIS — R41.3 MEMORY LOSS: ICD-10-CM

## 2024-06-05 DIAGNOSIS — K59.00 CONSTIPATION, UNSPECIFIED CONSTIPATION TYPE: ICD-10-CM

## 2024-06-05 DIAGNOSIS — K21.9 GASTROESOPHAGEAL REFLUX DISEASE, UNSPECIFIED WHETHER ESOPHAGITIS PRESENT: ICD-10-CM

## 2024-06-05 DIAGNOSIS — Z00.00 MEDICARE ANNUAL WELLNESS VISIT, SUBSEQUENT: Primary | ICD-10-CM

## 2024-06-05 DIAGNOSIS — E87.1 HYPONATREMIA: ICD-10-CM

## 2024-06-05 DIAGNOSIS — N40.1 BPH WITH URINARY OBSTRUCTION: ICD-10-CM

## 2024-06-05 LAB
BASOPHILS # BLD AUTO: 0 10E3/UL (ref 0–0.2)
BASOPHILS NFR BLD AUTO: 0 %
EOSINOPHIL # BLD AUTO: 0.1 10E3/UL (ref 0–0.7)
EOSINOPHIL NFR BLD AUTO: 1 %
ERYTHROCYTE [DISTWIDTH] IN BLOOD BY AUTOMATED COUNT: 12.3 % (ref 10–15)
HBA1C MFR BLD: 5.2 % (ref 0–5.6)
HCT VFR BLD AUTO: 40.5 % (ref 40–53)
HGB BLD-MCNC: 13.6 G/DL (ref 13.3–17.7)
IMM GRANULOCYTES # BLD: 0 10E3/UL
IMM GRANULOCYTES NFR BLD: 0 %
LYMPHOCYTES # BLD AUTO: 1.2 10E3/UL (ref 0.8–5.3)
LYMPHOCYTES NFR BLD AUTO: 25 %
MCH RBC QN AUTO: 31.5 PG (ref 26.5–33)
MCHC RBC AUTO-ENTMCNC: 33.6 G/DL (ref 31.5–36.5)
MCV RBC AUTO: 94 FL (ref 78–100)
MONOCYTES # BLD AUTO: 0.5 10E3/UL (ref 0–1.3)
MONOCYTES NFR BLD AUTO: 10 %
NEUTROPHILS # BLD AUTO: 3 10E3/UL (ref 1.6–8.3)
NEUTROPHILS NFR BLD AUTO: 63 %
PLATELET # BLD AUTO: 234 10E3/UL (ref 150–450)
RBC # BLD AUTO: 4.32 10E6/UL (ref 4.4–5.9)
WBC # BLD AUTO: 4.8 10E3/UL (ref 4–11)

## 2024-06-05 PROCEDURE — 99214 OFFICE O/P EST MOD 30 MIN: CPT | Mod: 25 | Performed by: FAMILY MEDICINE

## 2024-06-05 PROCEDURE — 83036 HEMOGLOBIN GLYCOSYLATED A1C: CPT | Performed by: FAMILY MEDICINE

## 2024-06-05 PROCEDURE — G0438 PPPS, INITIAL VISIT: HCPCS | Performed by: FAMILY MEDICINE

## 2024-06-05 PROCEDURE — 36415 COLL VENOUS BLD VENIPUNCTURE: CPT | Performed by: FAMILY MEDICINE

## 2024-06-05 PROCEDURE — 80053 COMPREHEN METABOLIC PANEL: CPT | Performed by: FAMILY MEDICINE

## 2024-06-05 PROCEDURE — 84443 ASSAY THYROID STIM HORMONE: CPT | Performed by: FAMILY MEDICINE

## 2024-06-05 PROCEDURE — 80061 LIPID PANEL: CPT | Performed by: FAMILY MEDICINE

## 2024-06-05 PROCEDURE — 85025 COMPLETE CBC W/AUTO DIFF WBC: CPT | Performed by: FAMILY MEDICINE

## 2024-06-05 PROCEDURE — 82607 VITAMIN B-12: CPT | Performed by: FAMILY MEDICINE

## 2024-06-05 RX ORDER — TRAZODONE HYDROCHLORIDE 100 MG/1
50-100 TABLET ORAL
Qty: 30 TABLET | Refills: 5 | Status: SHIPPED | OUTPATIENT
Start: 2024-06-05 | End: 2024-09-03

## 2024-06-05 RX ORDER — OMEPRAZOLE 40 MG/1
40 CAPSULE, DELAYED RELEASE ORAL DAILY
Qty: 90 CAPSULE | Refills: 3 | Status: SHIPPED | OUTPATIENT
Start: 2024-06-05

## 2024-06-05 ASSESSMENT — SOCIAL DETERMINANTS OF HEALTH (SDOH): HOW OFTEN DO YOU GET TOGETHER WITH FRIENDS OR RELATIVES?: NEVER

## 2024-06-05 ASSESSMENT — PATIENT HEALTH QUESTIONNAIRE - PHQ9
10. IF YOU CHECKED OFF ANY PROBLEMS, HOW DIFFICULT HAVE THESE PROBLEMS MADE IT FOR YOU TO DO YOUR WORK, TAKE CARE OF THINGS AT HOME, OR GET ALONG WITH OTHER PEOPLE: SOMEWHAT DIFFICULT
SUM OF ALL RESPONSES TO PHQ QUESTIONS 1-9: 8
SUM OF ALL RESPONSES TO PHQ QUESTIONS 1-9: 8

## 2024-06-05 ASSESSMENT — PAIN SCALES - GENERAL: PAINLEVEL: NO PAIN (0)

## 2024-06-05 NOTE — PROGRESS NOTES
Answers submitted by the patient for this visit:  Patient Health Questionnaire (Submitted on 6/5/2024)  If you checked off any problems, how difficult have these problems made it for you to do your work, take care of things at home, or get along with other people?: Somewhat difficult  PHQ9 TOTAL SCORE: 8  Preventive Care Visit  [facility]  Ramesh Shankar MD, Family Medicine  Jun 5, 2024      Assessment & Plan     Medicare annual wellness visit, subsequent  Annual Wellness Visit completed.  Risk questionaire reviewed in detail.  Appropriate preventive services were discussed with this patient, including applicable screening as appropriate for fall prevention, nutrition, physical activity, tobacco-use cessation, weight loss, and cognition.  Annual Wellness Visits recommended to continue.     Hypercholesteremia  Hypercholesterolemia.  Continues atorvastatin 10 mg daily.  Update lipid cascade today while fasting  - TSH with free T4 reflex  - lipid cascade    Gastroesophageal reflux disease, unspecified whether esophagitis present  Increase omeprazole from 20 mg to 40 mg daily.  Follow-up 3 months to ensure resolution of breakthrough symptoms.  - Comprehensive metabolic panel    BPH with urinary obstruction  Tamsulosin 0.4 mg using 2 tablets daily.  Digital rectal exam appears relatively normal with only mild prostate enlargement without induration or nodularity.    Decreased hearing of both ears  Bilateral hearing loss.  Audiology referral placed.  - Adult Audiology  Referral    Hyponatremia  History of mild hyponatremia and will update CBC and comprehensive metabolic panel.  - Comprehensive metabolic panel    Impaired fasting glucose  Impaired fasting glucose and will check A1c and fasting glucose.  - Comprehensive metabolic panel  - Hemoglobin A1c    Insomnia, unspecified type  Trazodone 100 mg use half tablet to 1 tablet at bedtime.  - traZODone (DESYREL) 100 MG tablet  Dispense: 30  "tablet; Refill: 5    Memory loss  Memory loss.  Patient notices it.  Lab assessment completed.  Reassess at follow-up in 3 months with slums testing to be completed at that time.  - Vitamin B12  - Comprehensive metabolic panel  - CBC with Platelets & Differential  - TSH with free T4 reflex    Gastroesophageal reflux disease without esophagitis  As above, and dose increase to 20 mg up to 40 mg daily.  - omeprazole (PRILOSEC) 40 MG DR capsule  Dispense: 90 capsule; Refill: 3    Constipation  May utilize MiraLAX 17 g daily.      Patient has been advised of split billing requirements and indicates understanding: No        BMI  Estimated body mass index is 25.07 kg/m  as calculated from the following:    Height as of this encounter: 1.759 m (5' 9.25\").    Weight as of this encounter: 77.6 kg (171 lb).       Counseling  Appropriate preventive services were discussed with this patient, including applicable screening as appropriate for fall prevention, nutrition, physical activity, Tobacco-use cessation, weight loss and cognition.  Checklist reviewing preventive services available has been given to the patient.  Reviewed patient's diet, addressing concerns and/or questions.   Patient is at risk for social isolation and has been provided with information about the benefit of social connection.   Patient reported safety concerns were addressed today.The patient was provided with written information regarding signs of hearing loss.   The patient's PHQ-9 score is consistent with mild depression. He was provided with information regarding depression.           Angie Malhotra is a 84 year old, presenting for the following:  Medicare Visit (Pt is fasting)        6/5/2024     8:42 AM   Additional Questions   Roomed by          Health Care Directive  Patient does not have a Health Care Directive or Living Will: Discussed advance care planning with patient; information given to patient to review.    [facility]    Annual wellness visit " "completed.  In general doing well.  Does have issues however with increasing memory difficulties which he has noticed.  Nothing specific just general worsening.  Sleep disruption as well.  OTC products have not helped including sleep ease as well as melatonin products.  Patient has poor hearing.  Has not had etiology.  Has breakthrough symptoms of reflux at times despite omeprazole 20 mg daily and will use antacids.  Did have more constipation now softer stools recently.  No abdominal pain.  Some belching or flatus as well.  Impaired fasting glucose historically.  Mild hyponatremia.  No alcohol consumption.  Comprehensive review of systems as above otherwise all negative.       x 20 - Betsey ( x 2016)    x  (prior  \"Connie\" x 73 - benign brain tumor, then PE noted following surgery, then seizure occurred...)   2 sons - Neftali and Justin   1 step-son - David, 1 step-daughter - Gladys   No smoke - quit ~ 39 y.o. after ~ 25 pack-year   EtOH occ beer   Surgeries: T & A as child   Hospitalizations: none   Mom -  (phlebitis)   Dad -  (minor heart attacks)   2 sis - arthritis   1 bro - Parkinson's   Work: unemployeed (can't find a job due to age) - prior \"runner\" with [employer] [phone]   Genaro Benavides (takes part in [employer])   Colonoscopy 10 with \"advance adenoma\" (tubulovillous) with recommendation for repeat in 6 months however patient cannot afford (insurance will only cover q 2 years) - subsequent colonoscopy with tubular adenoma 2014 and told to repeat 5 years.               2024   General Health   How would you rate your overall physical health? Good   Feel stress (tense, anxious, or unable to sleep) Rather much         2024   Nutrition   Diet: Regular (no restrictions)    I don't know          No data to display                  2024   Social Factors   Frequency of gathering with friends or relatives " Never   Worry food won't last until get money to buy more No   Food not last or not have enough money for food? No   Do you have housing?  Yes   Are you worried about losing your housing? No   Lack of transportation? No   Unable to get utilities (heat,electricity)? No         2024   Activities of Daily Living- Home Safety   Needs help with the following daily activites None of the above   Safety concerns in the home Throw rugs in the hallway         2024   Dental   Dentist two times every year? Yes          No data to display                    2024   Urinary Incontinence Screening   Bothered by leaking urine in past 6 months No         2024   TB Screening   Were you born outside of the [place]? No         Today's PHQ-2 Score:       2024     8:57 AM   PHQ-2 (  [employer])   Q1: Little interest or pleasure in doing things 3   Q2: Feeling down, depressed or hopeless 1   PHQ-2 Score 4   Q1: Little interest or pleasure in doing things Nearly every day   Q2: Feeling down, depressed or hopeless Several days   PHQ-2 Score 4           2024   Substance Use   Alcohol more than 3/day or more than 7/wk No   Do you have a current opioid prescription? No   How severe/bad is pain from 1 to 10? 0/10 (No Pain)   Do you use any other substances recreationally? No     Social History     Tobacco Use    Smoking status: Former   Substance Use Topics    Alcohol use: No    Drug use: No         Fracture Risk Assessment Tool  Link to Frax Calculator  Use the information below to complete the Frax calculator  : 1940  Sex: male  Weight (kg): 77.6 kg (actual weight)  Height (cm): 175.9 cm  Previous Fragility Fracture:  No  History of parent with fractured hip:  No  Current Smoking:  No  Patient has been on glucocorticoids for more than 3 months (5mg/day or more): No  Rheumatoid Arthritis on Problem List:  No  Secondary Osteoporosis on Problem List:  No  Consumes 3 or more units of alcohol per day: No  Femoral Neck BMD  (g/cm2)            Reviewed and updated as needed this visit by Provider                    No past medical history on file.  Past Surgical History:   Procedure Laterality Date    TONSILLECTOMY & ADENOIDECTOMY       Lab work is in process  Labs reviewed in EPIC  BP Readings from Last 3 Encounters:   06/05/24 130/70   12/05/23 138/70   07/20/23 (!) 151/70    Wt Readings from Last 3 Encounters:   06/05/24 77.6 kg (171 lb)   12/05/23 80.3 kg (177 lb)   07/20/23 75.8 kg (167 lb)                  Patient Active Problem List   Diagnosis    External Hemorrhoids    BPH with urinary obstruction    Insomnia    Hypercholesteremia    Migraine Headache    Esophageal Reflux    Dermatitis    Constipation    Atopic Dermatitis    Seborrheic Dermatitis    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    Joint Pain, Localized In The Left Shoulder    Impaired fasting glucose    Right shoulder pain    Herpes zoster    Benign prostatic hyperplasia with urinary obstruction    Elevated fasting blood sugar    Elevated PSA    Malignant neoplasm of skin    Minimal cognitive impairment     Past Surgical History:   Procedure Laterality Date    TONSILLECTOMY & ADENOIDECTOMY         Social History     Tobacco Use    Smoking status: Former    Smokeless tobacco: Not on file   Substance Use Topics    Alcohol use: No     Family History   Problem Relation Age of Onset    Heart Disease Father     Parkinsonism Brother          Current Outpatient Medications   Medication Sig Dispense Refill    ascorbic Acid (VITAMIN C) 500 mg CpER [ASCORBIC ACID (VITAMIN C) 500 MG CPER] Take 1,000 mg by mouth daily.      aspirin 81 MG EC tablet [ASPIRIN 81 MG EC TABLET] Take 81 mg by mouth daily.      atorvastatin (LIPITOR) 10 MG tablet Take 1 tablet (10 mg) by mouth daily 90 tablet 1    fluticasone (FLONASE) 50 MCG/ACT nasal spray SPRAY 2 SPRAYS IN EACH NOSTRIL EVERY DAY      hydrocortisone 2.5 % cream Apply topically 2 times daily 30 g 3    MULTIVITAMIN ORAL  "[MULTIVITAMIN ORAL] Take 1 tablet by mouth daily.      olopatadine (PATANOL) 0.1 % ophthalmic solution Place 1 drop into both eyes 2 times daily 5 mL 3    omeprazole (PRILOSEC) 40 MG DR capsule Take 1 capsule (40 mg) by mouth daily 90 capsule 3    tamsulosin (FLOMAX) 0.4 MG capsule Take 2 capsules (0.8 mg) by mouth daily 180 capsule 2    traZODone (DESYREL) 100 MG tablet Take 0.5-1 tablets ( mg) by mouth nightly as needed for sleep 30 tablet 5     Allergies   Allergen Reactions    Penicillins Hives     Current providers sharing in care for this patient include:  Patient Care Team:  [facility] as PCP - Ramesh Mcgrath MD as Assigned PCP    The following health maintenance items are reviewed in Epic and correct as of today:  Health Maintenance   Topic Date Due    MEDICARE ANNUAL WELLNESS VISIT  05/12/2023    COLORECTAL CANCER SCREENING  01/14/2024    INFLUENZA VACCINE (Season Ended) 09/01/2024    LIPID  12/05/2024    ANNUAL REVIEW OF HM ORDERS  12/05/2024    FALL RISK ASSESSMENT  06/05/2025    DTAP/TDAP/TD IMMUNIZATION (3 - Td or Tdap) 01/13/2027    ADVANCE CARE PLANNING  06/05/2029    PHQ-2 (once per calendar year)  Completed    Pneumococcal Vaccine: 65+ Years  Completed    ZOSTER IMMUNIZATION  Completed    RSV VACCINE (Pregnancy & 60+)  Completed    COVID-19 Vaccine  Completed    IPV IMMUNIZATION  Aged Out    HPV IMMUNIZATION  Aged Out    MENINGITIS IMMUNIZATION  Aged Out    RSV MONOCLONAL ANTIBODY  Aged Out         Review of Systems  Constitutional, HEENT, cardiovascular, pulmonary, GI, , musculoskeletal, neuro, skin, endocrine and psych systems are negative, except as otherwise noted.     Objective    Exam  /70   Pulse 67   Temp 97.9  F (36.6  C)   Resp 15   Ht 1.759 m (5' 9.25\")   Wt 77.6 kg (171 lb)   SpO2 97%   BMI 25.07 kg/m     Estimated body mass index is 25.07 kg/m  as calculated from the following:    Height as of this encounter: 1.759 m (5' 9.25\").    Weight as of " this encounter: 77.6 kg (171 lb).    Physical Exam  GENERAL: alert and no distress  EYES: Eyes grossly normal to inspection, PERRL and conjunctivae and sclerae normal  HENT: ear canals and TM's normal, nose and mouth without ulcers or lesions  NECK: no adenopathy, no asymmetry, masses, or scars  RESP: lungs clear to auscultation - no rales, rhonchi or wheezes  CV: regular rate and rhythm, normal S1 S2, no S3 or S4, no murmur, click or rub, no peripheral edema  ABDOMEN: soft, nontender, no hepatosplenomegaly, no masses and bowel sounds normal   (male): normal male genitalia without lesions or urethral discharge, no hernia  RECTAL: normal sphincter tone, no rectal masses, prostate normal size, smooth, nontender without nodules or masses  MS: no gross musculoskeletal defects noted, no edema  SKIN: no suspicious lesions or rashes  NEURO: Normal strength and tone, mentation intact and speech normal  PSYCH: mentation appears normal, affect normal/bright        6/5/2024   Mini Cog   Clock Draw Score 2 Normal   3 Item Recall 2 objects recalled   Mini Cog Total Score 4              Signed Electronically by: Ramesh Shankar MD

## 2024-06-06 LAB
ALBUMIN SERPL BCG-MCNC: 4.3 G/DL (ref 3.5–5.2)
ALP SERPL-CCNC: 75 U/L (ref 40–150)
ALT SERPL W P-5'-P-CCNC: 15 U/L (ref 0–70)
ANION GAP SERPL CALCULATED.3IONS-SCNC: 10 MMOL/L (ref 7–15)
AST SERPL W P-5'-P-CCNC: 21 U/L (ref 0–45)
BILIRUB SERPL-MCNC: 1 MG/DL
BUN SERPL-MCNC: 14.3 MG/DL (ref 8–23)
CALCIUM SERPL-MCNC: 9.6 MG/DL (ref 8.8–10.2)
CHLORIDE SERPL-SCNC: 99 MMOL/L (ref 98–107)
CHOLEST SERPL-MCNC: 162 MG/DL
CREAT SERPL-MCNC: 0.95 MG/DL (ref 0.67–1.17)
DEPRECATED HCO3 PLAS-SCNC: 25 MMOL/L (ref 22–29)
EGFRCR SERPLBLD CKD-EPI 2021: 78 ML/MIN/1.73M2
GLUCOSE SERPL-MCNC: 87 MG/DL (ref 70–99)
HDLC SERPL-MCNC: 71 MG/DL
LDLC SERPL CALC-MCNC: 80 MG/DL
NONHDLC SERPL-MCNC: 91 MG/DL
POTASSIUM SERPL-SCNC: 4.5 MMOL/L (ref 3.4–5.3)
PROT SERPL-MCNC: 6.7 G/DL (ref 6.4–8.3)
SODIUM SERPL-SCNC: 134 MMOL/L (ref 135–145)
TRIGL SERPL-MCNC: 55 MG/DL
TSH SERPL DL<=0.005 MIU/L-ACNC: 0.59 UIU/ML (ref 0.3–4.2)
VIT B12 SERPL-MCNC: 597 PG/ML (ref 232–1245)

## 2024-07-26 DIAGNOSIS — E78.00 HYPERCHOLESTEREMIA: ICD-10-CM

## 2024-07-26 RX ORDER — ATORVASTATIN CALCIUM 10 MG/1
10 TABLET, FILM COATED ORAL DAILY
Qty: 100 TABLET | Refills: 3 | Status: SHIPPED | OUTPATIENT
Start: 2024-07-26

## 2024-07-26 NOTE — TELEPHONE ENCOUNTER
Patient has [facility] coverage and with this insurance plan, the patient is eligible to receive certain prescriptions as a 100-day supply at the 90-day supply cost.      Prescriptions to be updated to 100-day supply: atorvastatin    New prescription needed given insufficient refills so pended for PCP review and signature.       Thank you!    Sherita Mane, PharmD, [facility]  Population Health Pharmacist  [phone]

## 2024-08-28 ENCOUNTER — OFFICE VISIT (OUTPATIENT)
Dept: AUDIOLOGY | Facility: CLINIC | Age: 84
End: 2024-08-28
Payer: COMMERCIAL

## 2024-08-28 DIAGNOSIS — H91.93 DECREASED HEARING OF BOTH EARS: ICD-10-CM

## 2024-08-28 DIAGNOSIS — H90.3 SENSORINEURAL HEARING LOSS (SNHL) OF BOTH EARS: Primary | ICD-10-CM

## 2024-08-28 PROCEDURE — 92557 COMPREHENSIVE HEARING TEST: CPT | Performed by: AUDIOLOGIST

## 2024-08-28 NOTE — PROGRESS NOTES
AUDIOLOGY REPORT    SUBJECTIVE:  Roscoe Merida is a 84 year old male who was seen in the Audiology Clinic at the [facility] for audiologic evaluation, referred by Ramesh Shankar M.D.     Patient reports having some difficulty understanding people when in a background of noise or when multiple people are talking. He reports intermittent buzzing tinnitus in the left ear. He reports significant noise exposure being in the [employer] for 6 years and in the [employer] around airplanes for years. He is also a philip and motorcyclist. He denies otalgia, otorrhea, aural fullness, dizziness, past ear surgery, and prior use of amplification.    OBJECTIVE:  Abuse Screening:  Do you feel unsafe at home or work/school? No  Do you feel threatened by someone? No  Does anyone try to keep you from having contact with others, or doing things outside of your home? No  Physical signs of abuse present? No     Fall Risk Screen:  1. Have you fallen two or more times in the past year? No  2. Have you fallen and had an injury in the past year? No    Timed Up and Go Score (in seconds): not tested  Is patient a fall risk? No  Referral initiated: No  Fall Risk Assessment Completed by Audiology    Otoscopic exam indicates ears are clear of cerumen bilaterally     Pure Tone Thresholds assessed using conventional audiometry with good  reliability from 250-8000 Hz bilaterally using insert earphones and circumaural headphones     RIGHT:  mild sloping to severe sensorineural hearing loss    LEFT:  normal sloping to severe sensorineural hearing loss    Tympanogram:    RIGHT: shallow mobility    LEFT:   essentially flat tracing  Unfortunately data was lost and could not be retrieved.     Reflexes (reported by stimulus ear):  Could not test due to inability to maintain seal      Speech Reception Threshold:    RIGHT: 35 dB HL    LEFT:   35 dB HL  Word Recognition Score:     RIGHT: 96% at 75 dB HL using NU-6 recorded word  list.    LEFT:   92% at 75 dB HL using NU-6 recorded word list.      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 Today s results were discussed with the patient in detail.     PLAN:  Patient was counseled regarding hearing loss and impact on communication.  Patient is a good candidate for amplification at this time. It is recommended that the patient follow up with Audiology for hearing aid consultation if interested.  Provided him a copy of his audiogram today.  Please call this clinic with questions regarding these results or recommendations.      Ahsan Davis., CCC-A  [place] Licensed Audiologist #[id number]

## 2024-09-03 ENCOUNTER — OFFICE VISIT (OUTPATIENT)
Dept: FAMILY MEDICINE | Facility: CLINIC | Age: 84
End: 2024-09-03
Payer: COMMERCIAL

## 2024-09-03 VITALS
DIASTOLIC BLOOD PRESSURE: 68 MMHG | BODY MASS INDEX: 24.73 KG/M2 | HEIGHT: 69 IN | WEIGHT: 167 LBS | OXYGEN SATURATION: 98 % | RESPIRATION RATE: 17 BRPM | SYSTOLIC BLOOD PRESSURE: 126 MMHG | TEMPERATURE: 97.3 F | HEART RATE: 55 BPM

## 2024-09-03 DIAGNOSIS — N40.1 BPH WITH URINARY OBSTRUCTION: ICD-10-CM

## 2024-09-03 DIAGNOSIS — R41.3 MEMORY LOSS: ICD-10-CM

## 2024-09-03 DIAGNOSIS — K21.9 GASTROESOPHAGEAL REFLUX DISEASE, UNSPECIFIED WHETHER ESOPHAGITIS PRESENT: Primary | ICD-10-CM

## 2024-09-03 DIAGNOSIS — E78.00 HYPERCHOLESTEREMIA: ICD-10-CM

## 2024-09-03 DIAGNOSIS — N13.8 BPH WITH URINARY OBSTRUCTION: ICD-10-CM

## 2024-09-03 DIAGNOSIS — H91.93 DECREASED HEARING OF BOTH EARS: ICD-10-CM

## 2024-09-03 PROCEDURE — 99214 OFFICE O/P EST MOD 30 MIN: CPT | Performed by: FAMILY MEDICINE

## 2024-09-03 ASSESSMENT — PAIN SCALES - GENERAL: PAINLEVEL: NO PAIN (0)

## 2024-09-03 NOTE — PROGRESS NOTES
Assessment/Plan:    Gastroesophageal reflux disease, unspecified whether esophagitis present  Gastroesophageal reflux described as improved after increasing omeprazole from 20 mg to 40 mg daily.  No significant breakthrough symptoms however still has dry cough intermittent at times x 10 years.  Will monitor.  No shortness of breath.  Lungs clear to auscultation.  Prior chest x-ray negative.    Memory loss  Memory loss concerns, short-term.  Declines further testing including slums testing at this time but would recommend at annual wellness visit after June 5, 2025.  Patient declines neurology referral.  Prior lab assessment including B12 levels normal.    BPH with urinary obstruction  Benefits of tamsulosin 0.4 mg daily continuing.  2-3 times per night nocturia however not too disruptive and declines further evaluation or treatment.    Hypercholesteremia  Hypercholesterolemia.  Continues atorvastatin 10 mg daily.    Decreased hearing of both ears  Does show high-frequency hearing loss bilateral relatively symmetric audiology paperwork that he has with today.  Patient is to see audiologist upcoming to determine need for hearing aids.    The longitudinal plan of care for the diagnosis(es)/condition(s) as documented were addressed during this visit. Due to the added complexity in care, I will continue to support Roscoe in the subsequent management and with ongoing continuity of care.              Subjective:    Roscoe Merida is seen today for follow-up assessment.  Had been seen for annual wellness visit June 5, 2024.  Reflux with breakthrough symptoms.  Had increased omeprazole from 20 mg up to 40 mg daily which she is tolerated.  Still has dry cough.  Nonproductive.  No shortness of breath.  Uncertain if positional in nature.  No postnasal drainage.  Denies seasonal allergy component.  Tamsulosin 0.4 mg daily for BPH.  2-3 times per night nocturia.  Not too disruptive.  Continues atorvastatin 10 mg daily for  "cholesterol management.  Bilateral hearing loss.  Audiology testing with high-frequency hearing loss that appears symmetric and was to follow-up with audiologist to determine need for hearing aids.       x 20 - Betsey ( x 2016)    x  (prior  \"Connie\" x 73 - benign brain tumor, then PE noted following surgery, then seizure occurred...)   2 sons - Neftali and Justin   1 step-son - David, 1 step-daughter - Gladys   No smoke - quit ~ 39 y.o. after ~ 25 pack-year   EtOH occ beer   Surgeries: T & A as child   Hospitalizations: none   Mom -  (phlebitis)   Dad -  (minor heart attacks)   2 sis - arthritis   1 bro - Parkinson's   Work: unemployeed (can't find a job due to age) - prior \"runner\" with [employer] [id number]   Genaro Benavides (takes part in [employer])   Colonoscopy 11 with \"advance adenoma\" (tubulovillous) with recommendation for repeat in 6 months however patient cannot afford (insurance will only cover q 2 years) - subsequent colonoscopy with tubular adenoma 2014 and told to repeat 5 years.       Past Surgical History:   Procedure Laterality Date    TONSILLECTOMY & ADENOIDECTOMY          Family History   Problem Relation Age of Onset    Heart Disease Father     Parkinsonism Brother         No past medical history on file.     Social History     Tobacco Use    Smoking status: Former   Substance Use Topics    Alcohol use: No    Drug use: No        Current Outpatient Medications   Medication Sig Dispense Refill    ascorbic Acid (VITAMIN C) 500 mg CpER [ASCORBIC ACID (VITAMIN C) 500 MG CPER] Take 1,000 mg by mouth daily.      atorvastatin (LIPITOR) 10 MG tablet Take 1 tablet (10 mg) by mouth daily 100 tablet 3    hydrocortisone 2.5 % cream Apply topically 2 times daily 30 g 3    olopatadine (PATANOL) 0.1 % ophthalmic solution Place 1 drop into both eyes 2 times daily 5 mL 3    omeprazole (PRILOSEC) 40 MG DR capsule Take 1 " "capsule (40 mg) by mouth daily 90 capsule 3    tamsulosin (FLOMAX) 0.4 MG capsule Take 2 capsules (0.8 mg) by mouth daily 180 capsule 2    aspirin 81 MG EC tablet [ASPIRIN 81 MG EC TABLET] Take 81 mg by mouth daily. (Patient not taking: Reported on 9/3/2024)      fluticasone (FLONASE) 50 MCG/ACT nasal spray SPRAY 2 SPRAYS IN EACH NOSTRIL EVERY DAY (Patient not taking: Reported on 9/3/2024)      MULTIVITAMIN ORAL [MULTIVITAMIN ORAL] Take 1 tablet by mouth daily. (Patient not taking: Reported on 9/3/2024)      traZODone (DESYREL) 100 MG tablet Take 0.5-1 tablets ( mg) by mouth nightly as needed for sleep (Patient not taking: Reported on 9/3/2024) 30 tablet 5          Objective:    Vitals:    09/03/24 0931   BP: 126/68   Pulse: 55   Resp: 17   Temp: 97.3  F (36.3  C)   SpO2: 98%   Weight: 75.8 kg (167 lb)   Height: 1.759 m (5' 9.25\")      Body mass index is 24.48 kg/m .    Alert.  No apparent distress.  Some difficulties with short-term memory per patient otherwise interacts normally without psychomotor agitation.  Cardiac exam regular.  Chest clear.  HEENT exam without significant evidence for postnasal drainage etc.  Neck supple.  Extremities warm and dry.      This note has been dictated using voice recognition software and as a result may contain minor grammatical errors and unintended word substitutions.         Answers submitted by the patient for this visit:  General Questionnaire (Submitted on 9/3/2024)  Chief Complaint: Chronic problems general questions HPI Form  What is the reason for your visit today? : scheduled appointment  How many days per week do you miss taking your medication?: 0    "

## 2024-11-15 DIAGNOSIS — G47.00 INSOMNIA, UNSPECIFIED TYPE: ICD-10-CM

## 2024-11-15 RX ORDER — TRAZODONE HYDROCHLORIDE 100 MG/1
TABLET ORAL
Qty: 30 TABLET | Refills: 5 | Status: SHIPPED | OUTPATIENT
Start: 2024-11-15

## 2024-11-15 NOTE — TELEPHONE ENCOUNTER
Called patient to discuss, patient stated that he does not really use it anymore and does not feel he needs the refill at this time. Patient stated if he changes his mind he will call back to let Dr. Shankar know.     Cristino Paul RN  [facility]

## 2024-12-23 ENCOUNTER — PATIENT OUTREACH (OUTPATIENT)
Dept: CARE COORDINATION | Facility: CLINIC | Age: 84
End: 2024-12-23
Payer: COMMERCIAL

## 2025-02-17 DIAGNOSIS — N40.1 BPH WITH URINARY OBSTRUCTION: ICD-10-CM

## 2025-02-17 DIAGNOSIS — N13.8 BPH WITH URINARY OBSTRUCTION: ICD-10-CM

## 2025-02-17 RX ORDER — TAMSULOSIN HYDROCHLORIDE 0.4 MG/1
0.8 CAPSULE ORAL DAILY
Qty: 180 CAPSULE | Refills: 1 | Status: SHIPPED | OUTPATIENT
Start: 2025-02-17

## 2025-05-05 DIAGNOSIS — G47.00 INSOMNIA, UNSPECIFIED TYPE: ICD-10-CM

## 2025-05-05 RX ORDER — TRAZODONE HYDROCHLORIDE 100 MG/1
TABLET ORAL
Qty: 30 TABLET | Refills: 4 | Status: SHIPPED | OUTPATIENT
Start: 2025-05-05

## 2025-05-27 ENCOUNTER — TELEPHONE (OUTPATIENT)
Dept: FAMILY MEDICINE | Facility: CLINIC | Age: 85
End: 2025-05-27
Payer: COMMERCIAL

## 2025-05-27 NOTE — CONFIDENTIAL NOTE
Contacts       Contact Date/Time Type Contact Phone/Fax    05/27/2025 01:57 PM CDT Phone (Outgoing) Yaelnd Roscoe MUJICA (Self) [phone] (M)    Left Message           Attempted to reach patient to: Reschedule an appointment    When patient returns call, please take this action: Assist with rescheduling    Reason for the reschedule: provider out of office    Reason for the visit: Preventive    When to reschedule: after 6/6/2025     Additional comments/info: ok to use 40 minute approval slot.     If unable to reschedule: Transfer to TC line (or update telephone encounter in after-hours)

## 2025-07-07 ENCOUNTER — TELEPHONE (OUTPATIENT)
Dept: FAMILY MEDICINE | Facility: CLINIC | Age: 85
End: 2025-07-07
Payer: COMMERCIAL

## 2025-07-07 NOTE — TELEPHONE ENCOUNTER
General Call    Contacts       Contact Date/Time Type Contact Phone/Fax    07/07/2025 05:32 PM CDT Phone (Incoming) Roscoe Merida (Self) [phone] (M)          Reason for Call:  Patient returning a call No TE     What are your questions or concerns:  return call no TE

## 2025-07-30 ENCOUNTER — TELEPHONE (OUTPATIENT)
Dept: FAMILY MEDICINE | Facility: CLINIC | Age: 85
End: 2025-07-30

## 2025-07-30 ENCOUNTER — OFFICE VISIT (OUTPATIENT)
Dept: FAMILY MEDICINE | Facility: CLINIC | Age: 85
End: 2025-07-30
Payer: COMMERCIAL

## 2025-07-30 VITALS
DIASTOLIC BLOOD PRESSURE: 64 MMHG | HEIGHT: 69 IN | WEIGHT: 161.8 LBS | OXYGEN SATURATION: 98 % | TEMPERATURE: 96.8 F | RESPIRATION RATE: 14 BRPM | BODY MASS INDEX: 23.96 KG/M2 | HEART RATE: 52 BPM | SYSTOLIC BLOOD PRESSURE: 122 MMHG

## 2025-07-30 DIAGNOSIS — E87.1 HYPONATREMIA: ICD-10-CM

## 2025-07-30 DIAGNOSIS — K21.9 GASTROESOPHAGEAL REFLUX DISEASE, UNSPECIFIED WHETHER ESOPHAGITIS PRESENT: ICD-10-CM

## 2025-07-30 DIAGNOSIS — R73.01 IMPAIRED FASTING GLUCOSE: ICD-10-CM

## 2025-07-30 DIAGNOSIS — E78.00 HYPERCHOLESTEREMIA: ICD-10-CM

## 2025-07-30 DIAGNOSIS — N13.8 BPH WITH URINARY OBSTRUCTION: ICD-10-CM

## 2025-07-30 DIAGNOSIS — K21.9 GASTROESOPHAGEAL REFLUX DISEASE WITHOUT ESOPHAGITIS: ICD-10-CM

## 2025-07-30 DIAGNOSIS — Z00.00 MEDICARE ANNUAL WELLNESS VISIT, SUBSEQUENT: Primary | ICD-10-CM

## 2025-07-30 DIAGNOSIS — N40.1 BPH WITH URINARY OBSTRUCTION: ICD-10-CM

## 2025-07-30 DIAGNOSIS — R41.3 MEMORY LOSS: ICD-10-CM

## 2025-07-30 LAB
ERYTHROCYTE [DISTWIDTH] IN BLOOD BY AUTOMATED COUNT: 12.2 % (ref 10–15)
EST. AVERAGE GLUCOSE BLD GHB EST-MCNC: 105 MG/DL
HBA1C MFR BLD: 5.3 % (ref 0–5.6)
HCT VFR BLD AUTO: 40.6 % (ref 40–53)
HGB BLD-MCNC: 13.9 G/DL (ref 13.3–17.7)
MCH RBC QN AUTO: 32.2 PG (ref 26.5–33)
MCHC RBC AUTO-ENTMCNC: 34.2 G/DL (ref 31.5–36.5)
MCV RBC AUTO: 94 FL (ref 78–100)
PLATELET # BLD AUTO: 231 10E3/UL (ref 150–450)
RBC # BLD AUTO: 4.32 10E6/UL (ref 4.4–5.9)
WBC # BLD AUTO: 4.1 10E3/UL (ref 4–11)

## 2025-07-30 PROCEDURE — 3074F SYST BP LT 130 MM HG: CPT | Performed by: FAMILY MEDICINE

## 2025-07-30 PROCEDURE — 80053 COMPREHEN METABOLIC PANEL: CPT | Performed by: FAMILY MEDICINE

## 2025-07-30 PROCEDURE — 36415 COLL VENOUS BLD VENIPUNCTURE: CPT | Performed by: FAMILY MEDICINE

## 2025-07-30 PROCEDURE — 99214 OFFICE O/P EST MOD 30 MIN: CPT | Mod: 25 | Performed by: FAMILY MEDICINE

## 2025-07-30 PROCEDURE — 3044F HG A1C LEVEL LT 7.0%: CPT | Performed by: FAMILY MEDICINE

## 2025-07-30 PROCEDURE — 85027 COMPLETE CBC AUTOMATED: CPT | Performed by: FAMILY MEDICINE

## 2025-07-30 PROCEDURE — 83036 HEMOGLOBIN GLYCOSYLATED A1C: CPT | Performed by: FAMILY MEDICINE

## 2025-07-30 PROCEDURE — 3078F DIAST BP <80 MM HG: CPT | Performed by: FAMILY MEDICINE

## 2025-07-30 PROCEDURE — G2211 COMPLEX E/M VISIT ADD ON: HCPCS | Performed by: FAMILY MEDICINE

## 2025-07-30 PROCEDURE — 80061 LIPID PANEL: CPT | Performed by: FAMILY MEDICINE

## 2025-07-30 PROCEDURE — 1126F AMNT PAIN NOTED NONE PRSNT: CPT | Performed by: FAMILY MEDICINE

## 2025-07-30 PROCEDURE — G0439 PPPS, SUBSEQ VISIT: HCPCS | Performed by: FAMILY MEDICINE

## 2025-07-30 RX ORDER — OMEPRAZOLE 40 MG/1
40 CAPSULE, DELAYED RELEASE ORAL DAILY
Qty: 90 CAPSULE | Refills: 3 | Status: SHIPPED | OUTPATIENT
Start: 2025-07-30

## 2025-07-30 RX ORDER — ATORVASTATIN CALCIUM 10 MG/1
10 TABLET, FILM COATED ORAL DAILY
Qty: 90 TABLET | Refills: 3 | Status: SHIPPED | OUTPATIENT
Start: 2025-07-30

## 2025-07-30 RX ORDER — TAMSULOSIN HYDROCHLORIDE 0.4 MG/1
0.8 CAPSULE ORAL DAILY
Qty: 180 CAPSULE | Refills: 3 | Status: SHIPPED | OUTPATIENT
Start: 2025-07-30

## 2025-07-30 RX ORDER — HYDROCORTISONE 25 MG/G
CREAM TOPICAL 2 TIMES DAILY
Qty: 30 G | Refills: 3 | Status: CANCELLED | OUTPATIENT
Start: 2025-07-30

## 2025-07-30 SDOH — HEALTH STABILITY: PHYSICAL HEALTH: ON AVERAGE, HOW MANY MINUTES DO YOU ENGAGE IN EXERCISE AT THIS LEVEL?: 20 MIN

## 2025-07-30 SDOH — HEALTH STABILITY: PHYSICAL HEALTH: ON AVERAGE, HOW MANY DAYS PER WEEK DO YOU ENGAGE IN MODERATE TO STRENUOUS EXERCISE (LIKE A BRISK WALK)?: 7 DAYS

## 2025-07-30 ASSESSMENT — PAIN SCALES - GENERAL: PAINLEVEL_OUTOF10: NO PAIN (0)

## 2025-07-30 ASSESSMENT — SOCIAL DETERMINANTS OF HEALTH (SDOH): HOW OFTEN DO YOU GET TOGETHER WITH FRIENDS OR RELATIVES?: ONCE A WEEK

## 2025-07-30 NOTE — PROGRESS NOTES
Preventive Care Visit  [facility]  Ramesh Shankar MD, Family Medicine  Jul 30, 2025      Assessment & Plan     Medicare annual wellness visit, subsequent  Annual Wellness Visit completed.  Risk questionaire reviewed in detail.  Appropriate preventive services were discussed with this patient, including applicable screening as appropriate for fall prevention, nutrition, physical activity, tobacco-use cessation, weight loss, and cognition.  Annual Wellness Visits recommended to continue.     Memory loss  Memory loss, progressive.  Patient not concerned however does agree to neurology referral for definitive cognitive testing and recommendations.  Living independently currently in a [address] and older apartment building.  Nephew checks in with him every 1 to 2 weeks.  Has a pillbox set up to help remember medications etc.  - Adult Neurology  Referral    Gastroesophageal reflux disease without esophagitis  Patient utilizing omeprazole 40 mg daily.  No breakthrough symptoms since increasing previously from 20 mg dosing.  - omeprazole (PRILOSEC) 40 MG DR capsule  Dispense: 90 capsule; Refill: 3    BPH with urinary obstruction  Tamsulosin 0.4 mg daily.  - tamsulosin (FLOMAX) 0.4 MG capsule  Dispense: 180 capsule; Refill: 3    Hypercholesteremia  Update lipid cascade today.  Continuing atorvastatin 10 mg daily.  - Lipid panel reflex to direct LDL Fasting  - atorvastatin (LIPITOR) 10 MG tablet  Dispense: 90 tablet; Refill: 3  - Lipid panel reflex to direct LDL Fasting    Hyponatremia  Hyponatremia historically and will ensure no evidence for significant hyponatremia associated with memory difficulties etc.  Denies fatigue issues.  - Comprehensive metabolic panel  - Comprehensive metabolic panel    Impaired fasting glucose  .  Fasting glucose.  A1c and fasting glucose obtained today.  - Hemoglobin A1c  - Hemoglobin A1c    Gastroesophageal reflux disease, unspecified whether esophagitis  present  Continuing omeprazole as noted 40 mg daily without breakthrough symptoms.  - Comprehensive metabolic panel  - CBC with platelets  - Comprehensive metabolic panel  - CBC with platelets    Patient has been advised of split billing requirements and indicates understanding: Yes    Counseling  Appropriate preventive services were addressed with this patient via screening, questionnaire, or discussion as appropriate for fall prevention, nutrition, physical activity, Tobacco-use cessation, social engagement, weight loss and cognition.  Checklist reviewing preventive services available has been given to the patient.  Reviewed patient's diet, addressing concerns and/or questions.   The patient was instructed to see the dentist every 6 months.   Patient reported safety concerns were addressed today.The patient was provided with written information regarding signs of hearing loss.   Reviewed preventive health counseling, as reflected in patient instructions       Regular exercise       Healthy diet/nutrition       Vision screening       Hearing screening       Alcohol Use        Osteoporosis prevention/bone health       Advance Care Planning          Angie Malhotra is a 85 year old, presenting for the following:  Physical (Pt is fasting)        7/30/2025     7:47 AM   Additional Questions   Roomed by KRISTOPHER Bryant   Accompanied by NephewArmand    Patient seen today for annual wellness visit.  In general doing well.  Nephew is with.  His name is Sharath.  They did discuss overlap with every week or 2 after checking in on [place].  They have set up pillbox for assistance in remembering medication.  Patient does have progressive memory difficulties however not bothersome to the patient.  His nephew describes worsening however.  Continues atorvastatin 10 mg daily for cholesterol management.  Sensorineural hearing loss historically.  Tamsulosin 0.4 mg using 2 tablets daily for BPH management.  No significant  "nocturia.  No fevers or chills.  History of impaired fasting glucose.  Denies any recurrent proctitis or need for hydrocortisone cream.  No recent headache issues.       x 20 - Betsey ( x 2016)   x  (prior  \"Connie\" x 73 - benign brain tumor, then PE noted following surgery, then seizure occurred...)  2 sons - Neftali and Justin  1 step-son - David, 1 step-daughter - Gladys  No smoke - quit ~ 39 y.o. after ~ 25 pack-year  EtOH occ beer  Surgeries: T & A as child  Hospitalizations: none  Mom -  (phlebitis)  Dad -  (minor heart attacks)  2 sis - arthritis  1 bro - Parkinson's  Work: unemployeed (can't find a job due to age) - prior \"runner\" with [employer] [id number]  Genaro Benavides (takes part in [employer])  Colonoscopy 11 with \"advance adenoma\" (tubulovillous) with recommendation for repeat in 6 months however patient cannot afford (insurance will only cover q 2 years) - subsequent colonoscopy with tubular adenoma 2014 and told to repeat 5 years.                 Advance Care Planning    Discussed advance care planning with patient; informed AVS has link to [employer].        2025   General Health   How would you rate your overall physical health? Excellent   Feel stress (tense, anxious, or unable to sleep) Not at all         2025   Nutrition   Diet: Regular (no restrictions)         2025   Exercise   Days per week of moderate/strenous exercise 7 days   Average minutes spent exercising at this level 20 min         2025   Social Factors   Frequency of gathering with friends or relatives Once a week   Worry food won't last until get money to buy more No   Food not last or not have enough money for food? No   Do you have housing? (Housing is defined as stable permanent housing and does not include staying outside in a car, in a tent, in an abandoned building, in an overnight shelter, or couch-surfing.) " Yes   Are you worried about losing your housing? No   Lack of transportation? No   Unable to get utilities (heat,electricity)? No         7/30/2025   Fall Risk   Fallen 2 or more times in the past year? No   Trouble with walking or balance? No          7/30/2025   Activities of Daily Living- Home Safety   Needs help with the following daily activites None of the above   Safety concerns in the home No grab bars in the bathroom         7/30/2025   Dental   Dentist two times every year? (!) NO         7/30/2025   Hearing Screening   Hearing concerns? (!) I NEED TO ASK PEOPLE TO SPEAK UP OR REPEAT THEMSELVES.    (!) IT'S HARD TO FOLLOW A CONVERSATION IN A NOISY RESTAURANT OR CROWDED ROOM.   Would you like a referral for hearing testing? No       Multiple values from one day are sorted in reverse-chronological order         7/30/2025   Driving Risk Screening   Patient/family members have concerns about driving No         7/30/2025   General Alertness/Fatigue Screening   Have you been more tired than usual lately? No         7/30/2025   Urinary Incontinence Screening   Bothered by leaking urine in past 6 months No         Today's PHQ-2 Score:       7/30/2025     7:54 AM   PHQ-2 ( 1999 [employer])   Q1: Little interest or pleasure in doing things 1   Q2: Feeling down, depressed or hopeless 0   PHQ-2 Score 1           7/30/2025   Substance Use   Alcohol more than 3/day or more than 7/wk Not Applicable   Do you have a current opioid prescription? No   How severe/bad is pain from 1 to 10? 0/10 (No Pain)   Do you use any other substances recreationally? No     Social History     Tobacco Use    Smoking status: Former   Substance Use Topics    Alcohol use: No    Drug use: No             Reviewed and updated as needed this visit by Provider    Allergies  Meds  Problems               No past medical history on file.  Past Surgical History:   Procedure Laterality Date    TONSILLECTOMY & ADENOIDECTOMY       Lab work is in  process  Labs reviewed in EPIC  BP Readings from Last 3 Encounters:   07/30/25 122/64   09/03/24 126/68   06/05/24 130/70    Wt Readings from Last 3 Encounters:   07/30/25 73.4 kg (161 lb 12.8 oz)   09/03/24 75.8 kg (167 lb)   06/05/24 77.6 kg (171 lb)                  Patient Active Problem List   Diagnosis    External Hemorrhoids    BPH with urinary obstruction    Insomnia    Hypercholesteremia    Migraine Headache    Esophageal Reflux    Dermatitis    Constipation    Atopic Dermatitis    Seborrheic Dermatitis    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    Joint Pain, Localized In The Left Shoulder    Impaired fasting glucose    Right shoulder pain    Herpes zoster    Benign prostatic hyperplasia with urinary obstruction    Elevated fasting blood sugar    Elevated PSA    Malignant neoplasm of skin    Minimal cognitive impairment     Past Surgical History:   Procedure Laterality Date    TONSILLECTOMY & ADENOIDECTOMY         Social History     Tobacco Use    Smoking status: Former    Smokeless tobacco: Not on file   Substance Use Topics    Alcohol use: No     Family History   Problem Relation Age of Onset    Heart Disease Father     Parkinsonism Brother          Current Outpatient Medications   Medication Sig Dispense Refill    atorvastatin (LIPITOR) 10 MG tablet Take 1 tablet (10 mg) by mouth daily. 90 tablet 3    MULTIVITAMIN ORAL [MULTIVITAMIN ORAL] Take 1 tablet by mouth daily.      olopatadine (PATANOL) 0.1 % ophthalmic solution Place 1 drop into both eyes 2 times daily 5 mL 3    omeprazole (PRILOSEC) 40 MG DR capsule Take 1 capsule (40 mg) by mouth daily. 90 capsule 3    tamsulosin (FLOMAX) 0.4 MG capsule Take 2 capsules (0.8 mg) by mouth daily. 180 capsule 3    traZODone (DESYREL) 100 MG tablet TAKE ONE-HALF TO ONE TABLET BY MOUTH EVERY EVENING AS NEEDED FOR SLEEP 30 tablet 4     Allergies   Allergen Reactions    Penicillins Hives     Current providers sharing in care for this patient include:  Patient  "Care Team:  Ramesh Shankar MD as PCP - General (Family Medicine)  Ramesh Shankar MD as Assigned PCP    The following health maintenance items are reviewed in Epic and correct as of today:  Health Maintenance   Topic Date Due    COLORECTAL CANCER SCREENING  01/14/2024    MEDICARE ANNUAL WELLNESS VISIT  06/05/2025    LIPID  06/05/2025    INFLUENZA VACCINE (1) 09/01/2025    ANNUAL REVIEW OF HM ORDERS  07/30/2026    FALL RISK ASSESSMENT  07/30/2026    DTAP/TDAP/TD VACCINE (3 - Td or Tdap) 01/13/2027    ADVANCE CARE PLANNING  07/30/2030    PHQ-2 (once per calendar year)  Completed    PNEUMOCOCCAL VACCINE 50+ YEARS  Completed    HPV VACCINE (No Doses Required) Completed    ZOSTER VACCINE  Completed    RSV VACCINE  Completed    COVID-19 VACCINE  Completed    MENINGITIS VACCINE  Aged Out         Review of Systems  Constitutional, HEENT, cardiovascular, pulmonary, GI, , musculoskeletal, neuro, skin, endocrine and psych systems are negative, except as otherwise noted.     Objective    Exam  /64   Pulse 52   Temp 96.8  F (36  C) (Temporal)   Resp 14   Ht 1.759 m (5' 9.25\")   Wt 73.4 kg (161 lb 12.8 oz)   SpO2 98%   BMI 23.72 kg/m     Estimated body mass index is 23.72 kg/m  as calculated from the following:    Height as of this encounter: 1.759 m (5' 9.25\").    Weight as of this encounter: 73.4 kg (161 lb 12.8 oz).      Physical Exam  GENERAL: alert and no distress.  BMI 23.72.  EYES: Eyes grossly normal to inspection, PERRL and conjunctivae and sclerae normal.  Glasses.  HENT: ear canals and TM's normal, nose and mouth without ulcers or lesions  NECK: no adenopathy, no asymmetry, masses, or scars  RESP: lungs clear to auscultation - no rales, rhonchi or wheezes  CV: regular rate and rhythm, normal S1 S2, no S3 or S4, no murmur, click or rub, no peripheral edema  ABDOMEN: soft, nontender, no hepatosplenomegaly, no masses and bowel sounds normal   (male):  deferred  RECTAL:  deferred  MS: no gross " musculoskeletal defects noted, no edema  SKIN: no suspicious lesions or rashes  NEURO: Normal strength and tone, mentation intact and speech normal  PSYCH: mentation appears normal, affect normal/bright        7/30/2025   Mini Cog   Clock Draw Score 2 Normal   3 Item Recall 0 objects recalled   Mini Cog Total Score 2              Signed Electronically by: Ramesh Shankar MD

## 2025-07-30 NOTE — TELEPHONE ENCOUNTER
Patient and son were wondering who is going to schedule the Neurologist visit. They would like one close to home. Please call Sharath at [phone] and the patient with updates. Thank you!    La Hutson on 7/30/2025 at 8:45 AM

## 2025-07-30 NOTE — TELEPHONE ENCOUNTER
Please notify:    [facility] will call you to coordinate your care as prescribed by your provider.  If you don't hear from a representative within 2 business days, please call [phone].     Ramesh Shankar MD

## 2025-07-30 NOTE — TELEPHONE ENCOUNTER
Called and relayed message to patient, patient verbalized understanding and no further questions at this time.     Cristino Paul RN  [facility]

## 2025-07-31 ENCOUNTER — PATIENT OUTREACH (OUTPATIENT)
Dept: CARE COORDINATION | Facility: CLINIC | Age: 85
End: 2025-07-31
Payer: COMMERCIAL

## 2025-07-31 LAB
ALBUMIN SERPL BCG-MCNC: 4.5 G/DL (ref 3.5–5.2)
ALP SERPL-CCNC: 79 U/L (ref 40–150)
ALT SERPL W P-5'-P-CCNC: 17 U/L (ref 0–70)
ANION GAP SERPL CALCULATED.3IONS-SCNC: 9 MMOL/L (ref 7–15)
AST SERPL W P-5'-P-CCNC: 19 U/L (ref 0–45)
BILIRUB SERPL-MCNC: 1.2 MG/DL
BUN SERPL-MCNC: 10.3 MG/DL (ref 8–23)
CALCIUM SERPL-MCNC: 9.7 MG/DL (ref 8.8–10.4)
CHLORIDE SERPL-SCNC: 99 MMOL/L (ref 98–107)
CHOLEST SERPL-MCNC: 159 MG/DL
CREAT SERPL-MCNC: 0.84 MG/DL (ref 0.67–1.17)
EGFRCR SERPLBLD CKD-EPI 2021: 85 ML/MIN/1.73M2
FASTING STATUS PATIENT QL REPORTED: YES
FASTING STATUS PATIENT QL REPORTED: YES
GLUCOSE SERPL-MCNC: 96 MG/DL (ref 70–99)
HCO3 SERPL-SCNC: 26 MMOL/L (ref 22–29)
HDLC SERPL-MCNC: 73 MG/DL
LDLC SERPL CALC-MCNC: 73 MG/DL
NONHDLC SERPL-MCNC: 86 MG/DL
POTASSIUM SERPL-SCNC: 4.5 MMOL/L (ref 3.4–5.3)
PROT SERPL-MCNC: 6.6 G/DL (ref 6.4–8.3)
SODIUM SERPL-SCNC: 134 MMOL/L (ref 135–145)
TRIGL SERPL-MCNC: 67 MG/DL

## 2025-08-04 ENCOUNTER — PATIENT OUTREACH (OUTPATIENT)
Dept: CARE COORDINATION | Facility: CLINIC | Age: 85
End: 2025-08-04
Payer: COMMERCIAL